# Patient Record
Sex: FEMALE | Race: WHITE | Employment: UNEMPLOYED | URBAN - METROPOLITAN AREA
[De-identification: names, ages, dates, MRNs, and addresses within clinical notes are randomized per-mention and may not be internally consistent; named-entity substitution may affect disease eponyms.]

---

## 2017-03-03 ENCOUNTER — APPOINTMENT (OUTPATIENT)
Dept: GENERAL RADIOLOGY | Facility: HOSPITAL | Age: 42
End: 2017-03-03
Attending: EMERGENCY MEDICINE
Payer: COMMERCIAL

## 2017-03-03 ENCOUNTER — HOSPITAL ENCOUNTER (EMERGENCY)
Facility: HOSPITAL | Age: 42
Discharge: HOME | End: 2017-03-03
Attending: EMERGENCY MEDICINE
Payer: COMMERCIAL

## 2017-03-03 VITALS
OXYGEN SATURATION: 100 % | TEMPERATURE: 99 F | WEIGHT: 130 LBS | HEART RATE: 53 BPM | HEIGHT: 64 IN | SYSTOLIC BLOOD PRESSURE: 109 MMHG | DIASTOLIC BLOOD PRESSURE: 74 MMHG | RESPIRATION RATE: 12 BRPM | BODY MASS INDEX: 22.2 KG/M2

## 2017-03-03 DIAGNOSIS — R42 DIZZINESS: ICD-10-CM

## 2017-03-03 DIAGNOSIS — M94.0 COSTAL CHONDRITIS: Primary | ICD-10-CM

## 2017-03-03 LAB
ALBUMIN SERPL-MCNC: 4.6 G/DL (ref 3.5–4.8)
ALP LIVER SERPL-CCNC: 46 U/L (ref 37–98)
ALT SERPL-CCNC: 21 U/L (ref 14–54)
AST SERPL-CCNC: 12 U/L (ref 15–41)
ATRIAL RATE: 74 BPM
BASOPHILS # BLD AUTO: 0.03 X10(3) UL (ref 0–0.1)
BASOPHILS NFR BLD AUTO: 0.6 %
BILIRUB SERPL-MCNC: 0.8 MG/DL (ref 0.1–2)
BUN BLD-MCNC: 18 MG/DL (ref 8–20)
CALCIUM BLD-MCNC: 10.1 MG/DL (ref 8.3–10.3)
CHLORIDE: 105 MMOL/L (ref 101–111)
CO2: 26 MMOL/L (ref 22–32)
CREAT BLD-MCNC: 0.79 MG/DL (ref 0.55–1.02)
EOSINOPHIL # BLD AUTO: 0.07 X10(3) UL (ref 0–0.3)
EOSINOPHIL NFR BLD AUTO: 1.3 %
ERYTHROCYTE [DISTWIDTH] IN BLOOD BY AUTOMATED COUNT: 12.2 % (ref 11.5–16)
GLUCOSE BLD-MCNC: 92 MG/DL (ref 70–99)
HCT VFR BLD AUTO: 39.7 % (ref 34–50)
HGB BLD-MCNC: 13.2 G/DL (ref 12–16)
IMMATURE GRANULOCYTE COUNT: 0.01 X10(3) UL (ref 0–1)
IMMATURE GRANULOCYTE RATIO %: 0.2 %
LYMPHOCYTES # BLD AUTO: 2.09 X10(3) UL (ref 0.9–4)
LYMPHOCYTES NFR BLD AUTO: 38.4 %
M PROTEIN MFR SERPL ELPH: 8.2 G/DL (ref 6.1–8.3)
MCH RBC QN AUTO: 32.1 PG (ref 27–33.2)
MCHC RBC AUTO-ENTMCNC: 33.2 G/DL (ref 31–37)
MCV RBC AUTO: 96.6 FL (ref 81–100)
MONOCYTES # BLD AUTO: 0.4 X10(3) UL (ref 0.1–0.6)
MONOCYTES NFR BLD AUTO: 7.4 %
NEUTROPHIL ABS PRELIM: 2.84 X10 (3) UL (ref 1.3–6.7)
NEUTROPHILS # BLD AUTO: 2.84 X10(3) UL (ref 1.3–6.7)
NEUTROPHILS NFR BLD AUTO: 52.1 %
P AXIS: 52 DEGREES
P-R INTERVAL: 120 MS
PLATELET # BLD AUTO: 124 10(3)UL (ref 150–450)
POTASSIUM SERPL-SCNC: 3.7 MMOL/L (ref 3.6–5.1)
Q-T INTERVAL: 370 MS
QRS DURATION: 78 MS
QTC CALCULATION (BEZET): 410 MS
R AXIS: 7 DEGREES
RBC # BLD AUTO: 4.11 X10(6)UL (ref 3.8–5.1)
RED CELL DISTRIBUTION WIDTH-SD: 42.9 FL (ref 35.1–46.3)
SODIUM SERPL-SCNC: 139 MMOL/L (ref 136–144)
T AXIS: 30 DEGREES
TROPONIN: <0.046 NG/ML (ref ?–0.05)
VENTRICULAR RATE: 74 BPM
WBC # BLD AUTO: 5.4 X10(3) UL (ref 4–13)

## 2017-03-03 PROCEDURE — 99285 EMERGENCY DEPT VISIT HI MDM: CPT

## 2017-03-03 PROCEDURE — 85025 COMPLETE CBC W/AUTO DIFF WBC: CPT | Performed by: EMERGENCY MEDICINE

## 2017-03-03 PROCEDURE — 96360 HYDRATION IV INFUSION INIT: CPT

## 2017-03-03 PROCEDURE — 71020 XR CHEST PA + LAT CHEST (CPT=71020): CPT

## 2017-03-03 PROCEDURE — 99284 EMERGENCY DEPT VISIT MOD MDM: CPT

## 2017-03-03 PROCEDURE — 84484 ASSAY OF TROPONIN QUANT: CPT | Performed by: EMERGENCY MEDICINE

## 2017-03-03 PROCEDURE — 93010 ELECTROCARDIOGRAM REPORT: CPT

## 2017-03-03 PROCEDURE — 80053 COMPREHEN METABOLIC PANEL: CPT | Performed by: EMERGENCY MEDICINE

## 2017-03-03 PROCEDURE — 96361 HYDRATE IV INFUSION ADD-ON: CPT

## 2017-03-03 PROCEDURE — 93005 ELECTROCARDIOGRAM TRACING: CPT

## 2017-03-03 NOTE — ED INITIAL ASSESSMENT (HPI)
Pt reports 2 weeks ago heart burn feeling, blurred vision, vertigo today. Pt reports taking stairs and became SOB, chest pain. Pt reports recent cold.

## 2017-03-03 NOTE — ED PROVIDER NOTES
Patient Seen in: [facility] Emergency Department    History   Patient presents with:  Chest Pain Angina (cardiovascular)    Stated Complaint: vertigo, blurred vision, heart burn x2 weeks    HPI    59-year-old female presents with chest pain and dizz in HPI.     Physical Exam       ED Triage Vitals   BP 03/03/17 1108 142/80 mmHg   Pulse 03/03/17 1108 67   Resp 03/03/17 1108 18   Temp 03/03/17 1108 98.5 °F (36.9 °C)   Temp src 03/03/17 1108 Temporal   SpO2 03/03/17 1108 100 %   O2 Device 03/03/17 1108 No axes as noted on EKG Report. Rate: 74  Rhythm: Sinus Rhythm  Reading: No evidence of acute ischemia. No arrhythmia.           Xr Chest Pa + Lat Chest (ray=[id number])    3/3/2017  PROCEDURE:  XR CHEST PA + LAT CHEST (CPT=71020)  INDICATIONS:  vertigo, blurred

## 2017-04-17 ENCOUNTER — OFFICE VISIT (OUTPATIENT)
Dept: FAMILY MEDICINE CLINIC | Facility: CLINIC | Age: 42
End: 2017-04-17

## 2017-04-17 VITALS
RESPIRATION RATE: 16 BRPM | OXYGEN SATURATION: 99 % | DIASTOLIC BLOOD PRESSURE: 76 MMHG | BODY MASS INDEX: 22.53 KG/M2 | HEART RATE: 70 BPM | HEIGHT: 64 IN | SYSTOLIC BLOOD PRESSURE: 108 MMHG | WEIGHT: 132 LBS | TEMPERATURE: 97 F

## 2017-04-17 DIAGNOSIS — Z13.228 SCREENING FOR ENDOCRINE, METABOLIC AND IMMUNITY DISORDER: ICD-10-CM

## 2017-04-17 DIAGNOSIS — Z13.0 SCREENING, ANEMIA, DEFICIENCY, IRON: ICD-10-CM

## 2017-04-17 DIAGNOSIS — Z13.220 SCREENING FOR LIPOID DISORDERS: ICD-10-CM

## 2017-04-17 DIAGNOSIS — Z11.51 SCREENING FOR HUMAN PAPILLOMAVIRUS (HPV): ICD-10-CM

## 2017-04-17 DIAGNOSIS — Z00.00 WELL WOMAN EXAM WITHOUT GYNECOLOGICAL EXAM: Primary | ICD-10-CM

## 2017-04-17 DIAGNOSIS — Z13.29 SCREENING FOR ENDOCRINE, METABOLIC AND IMMUNITY DISORDER: ICD-10-CM

## 2017-04-17 DIAGNOSIS — N85.2 ENLARGED UTERUS: ICD-10-CM

## 2017-04-17 DIAGNOSIS — M54.12 LEFT CERVICAL RADICULOPATHY: ICD-10-CM

## 2017-04-17 DIAGNOSIS — Z13.0 SCREENING FOR ENDOCRINE, METABOLIC AND IMMUNITY DISORDER: ICD-10-CM

## 2017-04-17 PROCEDURE — 88175 CYTOPATH C/V AUTO FLUID REDO: CPT | Performed by: FAMILY MEDICINE

## 2017-04-17 PROCEDURE — 87624 HPV HI-RISK TYP POOLED RSLT: CPT | Performed by: FAMILY MEDICINE

## 2017-04-17 PROCEDURE — 99395 PREV VISIT EST AGE 18-39: CPT | Performed by: FAMILY MEDICINE

## 2017-04-17 RX ORDER — IBUPROFEN 200 MG
200 TABLET ORAL EVERY 6 HOURS PRN
COMMUNITY
End: 2020-10-08

## 2017-04-17 NOTE — PROGRESS NOTES
HPI:   David Pryor is a 46year old female who presents for a complete physical exam. Symptoms: periods are regular. Patient complains of recurrent pain in her left lower lateral neck which will radiate down into her left hand.   She denies paresthesias • Heart Disease Father    • Stroke Neg    • Cancer Neg       Social History:     Smoking Status: Former Smoker                   Packs/Day: 0.00  Years:           Quit date: 01/01/1999    Smokeless Status: Never Used                        Alcohol Use: Y cyanosis, clubbing or edema  NEURO: Oriented times three,cranial nerves are intact,motor and sensory are grossly intact    ASSESSMENT AND PLAN:     Well woman exam without gynecological exam  (primary encounter diagnosis)  Left cervical radiculopathy  Enla

## 2017-04-18 RX ORDER — METHYLPREDNISOLONE 4 MG/1
TABLET ORAL
Qty: 1 KIT | Refills: 0 | Status: SHIPPED | OUTPATIENT
Start: 2017-04-18 | End: 2017-11-30

## 2017-04-21 ENCOUNTER — OFFICE VISIT (OUTPATIENT)
Dept: NEUROLOGY | Facility: CLINIC | Age: 42
End: 2017-04-21

## 2017-04-21 DIAGNOSIS — R20.0 NUMBNESS AND TINGLING IN LEFT HAND: Primary | ICD-10-CM

## 2017-04-21 DIAGNOSIS — R20.2 NUMBNESS AND TINGLING IN LEFT HAND: Primary | ICD-10-CM

## 2017-04-21 PROCEDURE — 95886 MUSC TEST DONE W/N TEST COMP: CPT | Performed by: OTHER

## 2017-04-21 PROCEDURE — 95908 NRV CNDJ TST 3-4 STUDIES: CPT | Performed by: OTHER

## 2017-04-21 NOTE — PROCEDURES
Date of service: 4/21/2017    Procedure: Nerve Conduction Study and Electromyography - complete EMG study in left upper extremity. History: Electrodiagnostic testing on this 46year old female was performed in left upper extremity.  The patient is compla 3. There is no electrophysiologic evidence of ongoing denervation in left C5-T1 myotomes suggestive of acute left C5-T1 radiculopathy.    A cervical radiculopathy above C5 level or chronic C5-T1 radiculopathy cannot be completely excluded from this study, C

## 2017-04-21 NOTE — PATIENT INSTRUCTIONS
Refill policies:    • Allow 2 business days for refills; controlled substances may take longer.   • Contact your pharmacy at least 5 days prior to running out of medication and have them send an electronic request or submit request through the “request re insurance carrier to obtain pre-certification or prior authorization. Unfortunately, JUAN DANIEL has seen an increase in denial of payment even though the procedure/test has been pre-certified.   You are strongly encouraged to contact your insurance carrier to v

## 2017-04-24 ENCOUNTER — TELEPHONE (OUTPATIENT)
Dept: FAMILY MEDICINE CLINIC | Facility: CLINIC | Age: 42
End: 2017-04-24

## 2017-04-24 NOTE — TELEPHONE ENCOUNTER
The EMG study was notable for possible mild carpal tunnel and ulnar nerve involvement. Cervical lesion could not be ruled out. Next step would be to obtain an MRI of the cervical spine to further evaluate this.

## 2017-04-25 ENCOUNTER — TELEPHONE (OUTPATIENT)
Dept: FAMILY MEDICINE CLINIC | Facility: CLINIC | Age: 42
End: 2017-04-25

## 2017-04-26 NOTE — TELEPHONE ENCOUNTER
LM to CB. Please tell Dasia her Epi-Pen was recalled due to a defect in the pen failing to deliver the medication. She can take the pen back to the pharmacy for an exchange.  ( paperwork on recall is at [employer])

## 2017-05-05 ENCOUNTER — HOSPITAL ENCOUNTER (OUTPATIENT)
Dept: ULTRASOUND IMAGING | Age: 42
Discharge: HOME | End: 2017-05-05
Attending: FAMILY MEDICINE
Payer: COMMERCIAL

## 2017-05-05 DIAGNOSIS — N85.2 ENLARGED UTERUS: ICD-10-CM

## 2017-05-05 PROCEDURE — 76856 US EXAM PELVIC COMPLETE: CPT | Performed by: FAMILY MEDICINE

## 2017-05-05 PROCEDURE — 76830 TRANSVAGINAL US NON-OB: CPT | Performed by: FAMILY MEDICINE

## 2017-07-25 ENCOUNTER — HOSPITAL ENCOUNTER (OUTPATIENT)
Dept: CT IMAGING | Facility: HOSPITAL | Age: 42
Discharge: HOME | End: 2017-07-25
Attending: FAMILY MEDICINE

## 2017-07-25 DIAGNOSIS — Z13.6 SCREENING FOR HEART DISEASE: ICD-10-CM

## 2017-08-07 ENCOUNTER — TELEPHONE (OUTPATIENT)
Dept: FAMILY MEDICINE CLINIC | Facility: CLINIC | Age: 42
End: 2017-08-07

## 2017-08-07 NOTE — TELEPHONE ENCOUNTER
Call from pt-sts \"had a heart CT done the end of July, received results in [facility] but don't see any comments from dr Kwasi Mansfield. The end of the report mentions they see left calcified lymph nodes but don't see any comments about what that means.  i've been

## 2017-08-07 NOTE — TELEPHONE ENCOUNTER
There is nothing pathologic about the calcifications. They usually occur as a result of previous inflammation or infection.   The rest of the CT of the chest was totally normal.

## 2017-08-07 NOTE — TELEPHONE ENCOUNTER
Call to pt-advised of info noted below from dr Melissa Linton. Patient voices understanding/no further questions.

## 2017-08-12 ENCOUNTER — APPOINTMENT (OUTPATIENT)
Dept: GENERAL RADIOLOGY | Facility: HOSPITAL | Age: 42
End: 2017-08-12
Attending: EMERGENCY MEDICINE
Payer: COMMERCIAL

## 2017-08-12 ENCOUNTER — HOSPITAL ENCOUNTER (EMERGENCY)
Facility: HOSPITAL | Age: 42
Discharge: HOME | End: 2017-08-12
Attending: EMERGENCY MEDICINE
Payer: COMMERCIAL

## 2017-08-12 VITALS
OXYGEN SATURATION: 99 % | HEART RATE: 70 BPM | RESPIRATION RATE: 16 BRPM | BODY MASS INDEX: 22.49 KG/M2 | WEIGHT: 135 LBS | DIASTOLIC BLOOD PRESSURE: 68 MMHG | SYSTOLIC BLOOD PRESSURE: 105 MMHG | HEIGHT: 65 IN | TEMPERATURE: 98 F

## 2017-08-12 DIAGNOSIS — M22.42 RUNNER'S KNEE, LEFT: Primary | ICD-10-CM

## 2017-08-12 PROCEDURE — 73562 X-RAY EXAM OF KNEE 3: CPT | Performed by: EMERGENCY MEDICINE

## 2017-08-12 PROCEDURE — 99283 EMERGENCY DEPT VISIT LOW MDM: CPT

## 2017-08-12 RX ORDER — NAPROXEN 500 MG/1
500 TABLET ORAL 2 TIMES DAILY PRN
Qty: 20 TABLET | Refills: 0 | Status: SHIPPED | OUTPATIENT
Start: 2017-08-12 | End: 2017-08-19

## 2017-08-12 RX ORDER — TRAMADOL HYDROCHLORIDE 50 MG/1
50 TABLET ORAL ONCE
Status: COMPLETED | OUTPATIENT
Start: 2017-08-12 | End: 2017-08-12

## 2017-08-12 RX ORDER — TRAMADOL HYDROCHLORIDE 50 MG/1
TABLET ORAL EVERY 4 HOURS PRN
Qty: 20 TABLET | Refills: 0 | Status: SHIPPED | OUTPATIENT
Start: 2017-08-12 | End: 2017-08-19

## 2017-08-12 NOTE — ED INITIAL ASSESSMENT (HPI)
Pt to ER c/o left knee pain. Pt states she has been training for marathon and pain started last week. Limited flexion and extension. Pt states she has not been doing any exercise this past week but pain is getting worse. Denies any numbness or tingling.  Ruperto Kawasaki

## 2017-08-12 NOTE — ED PROVIDER NOTES
Patient Seen in: [facility] Emergency Department    History   Patient presents with:  Lower Extremity Injury (musculoskeletal)    Stated Complaint: left knee pain/began last weekend after running    HPI    53-year-old female presents for evaluation weekend after running  Other systems are as noted in HPI. Constitutional and vital signs reviewed. All other systems reviewed and negative except as noted above. PSFH elements reviewed from today and agreed except as otherwise stated in HPI.     Ph needed for Pain. Qty: 20 tablet Refills: 0    naproxen 500 MG Oral Tab  Take 1 tablet (500 mg total) by mouth 2 (two) times daily as needed.   Qty: 20 tablet Refills: 0

## 2017-11-30 ENCOUNTER — HOSPITAL ENCOUNTER (OUTPATIENT)
Age: 42
Discharge: HOME | End: 2017-11-30
Attending: FAMILY MEDICINE
Payer: COMMERCIAL

## 2017-11-30 VITALS
HEART RATE: 54 BPM | DIASTOLIC BLOOD PRESSURE: 94 MMHG | RESPIRATION RATE: 16 BRPM | TEMPERATURE: 99 F | SYSTOLIC BLOOD PRESSURE: 124 MMHG | OXYGEN SATURATION: 99 %

## 2017-11-30 DIAGNOSIS — Z20.818 STREP THROAT EXPOSURE: ICD-10-CM

## 2017-11-30 DIAGNOSIS — J02.9 ACUTE PHARYNGITIS, UNSPECIFIED ETIOLOGY: Primary | ICD-10-CM

## 2017-11-30 PROCEDURE — 87081 CULTURE SCREEN ONLY: CPT | Performed by: FAMILY MEDICINE

## 2017-11-30 PROCEDURE — 99204 OFFICE O/P NEW MOD 45 MIN: CPT

## 2017-11-30 PROCEDURE — 87430 STREP A AG IA: CPT | Performed by: FAMILY MEDICINE

## 2017-11-30 PROCEDURE — 99214 OFFICE O/P EST MOD 30 MIN: CPT

## 2017-11-30 RX ORDER — AMOXICILLIN 875 MG/1
875 TABLET, COATED ORAL 2 TIMES DAILY
Qty: 20 TABLET | Refills: 0 | Status: SHIPPED | OUTPATIENT
Start: 2017-11-30 | End: 2017-12-10

## 2017-11-30 NOTE — ED PROVIDER NOTES
Patient Seen in: [address] [facility]    History   Patient presents with:  Sore Throat    Stated Complaint: FEVER/SORE THROAT/ACHY    HPI    37-year-old female presents to the clinic today with chief complaints of sore throat, myalgia, headach trachea midline and thyroid not enlarged, symmetric, no tenderness/mass/nodules  Lungs: clear to auscultation bilaterally  Heart: S1, S2 normal, no murmur, click, rub or gallop, regular rate and rhythm  Abdomen: soft, non-tender; bowel sounds normal; no ma

## 2017-11-30 NOTE — ED INITIAL ASSESSMENT (HPI)
Pt states since last night achey and sore throat. No fever or cold symptoms.  Daughter diagnosed with strep on sunday

## 2018-02-10 ENCOUNTER — TELEPHONE (OUTPATIENT)
Dept: FAMILY MEDICINE CLINIC | Facility: CLINIC | Age: 43
End: 2018-02-10

## 2018-02-10 ENCOUNTER — HOSPITAL ENCOUNTER (OUTPATIENT)
Age: 43
Discharge: HOME | End: 2018-02-10
Payer: COMMERCIAL

## 2018-02-10 VITALS
HEART RATE: 51 BPM | WEIGHT: 135 LBS | BODY MASS INDEX: 22.49 KG/M2 | TEMPERATURE: 97 F | DIASTOLIC BLOOD PRESSURE: 70 MMHG | RESPIRATION RATE: 16 BRPM | SYSTOLIC BLOOD PRESSURE: 110 MMHG | OXYGEN SATURATION: 99 % | HEIGHT: 65 IN

## 2018-02-10 DIAGNOSIS — J02.0 STREPTOCOCCAL SORE THROAT: Primary | ICD-10-CM

## 2018-02-10 LAB — POCT RAPID STREP: POSITIVE

## 2018-02-10 PROCEDURE — 87430 STREP A AG IA: CPT | Performed by: PHYSICIAN ASSISTANT

## 2018-02-10 PROCEDURE — 99213 OFFICE O/P EST LOW 20 MIN: CPT

## 2018-02-10 PROCEDURE — 99214 OFFICE O/P EST MOD 30 MIN: CPT

## 2018-02-10 RX ORDER — AMOXICILLIN 875 MG/1
875 TABLET, COATED ORAL 2 TIMES DAILY
Qty: 20 TABLET | Refills: 0 | Status: SHIPPED | OUTPATIENT
Start: 2018-02-10 | End: 2018-02-20

## 2018-02-10 NOTE — ED PROVIDER NOTES
Patient Seen in: [address] [facility]    History   Patient presents with:  Sore Throat    Stated Complaint: sore throat    HPI    Dewey Hopper is a 44-year-old female presents today for evaluation of persistent sore throat.   She has a past medical hist Exam   Constitutional: She is oriented to person, place, and time. She appears well-developed and well-nourished. HENT:   Head: Normocephalic and atraumatic.    Right Ear: Hearing, tympanic membrane, external ear and ear canal normal.   Left Ear: Hearing, diagnosis)    Disposition:  Discharge  2/10/2018 11:28 am    Follow-up:  Essence Majano MD  [address] [place] [address] [address]  [address] [place] [address] [facility] [phone]    In 1 week  As needed        Medications Prescribed:  Current Discharge Medication List    START ta

## 2018-02-10 NOTE — TELEPHONE ENCOUNTER
Please call Dasia on Monday for an update.   If any of her children have symptoms we could call in a antibiotic as they likely have strep

## 2018-02-12 NOTE — TELEPHONE ENCOUNTER
T.C. To Dasia. She is feeling better. I asked if any of the kids had any symptoms and she said they were all fine at this time. I advised her if any of the kids start having symptoms to call and Dr. Thao Reyes would prescribe an antibiotic for them.  Dasia agree

## 2018-02-26 ENCOUNTER — HOSPITAL ENCOUNTER (OUTPATIENT)
Age: 43
Discharge: HOME | End: 2018-02-26
Attending: FAMILY MEDICINE
Payer: COMMERCIAL

## 2018-02-26 ENCOUNTER — TELEPHONE (OUTPATIENT)
Dept: FAMILY MEDICINE CLINIC | Facility: CLINIC | Age: 43
End: 2018-02-26

## 2018-02-26 VITALS
TEMPERATURE: 99 F | DIASTOLIC BLOOD PRESSURE: 68 MMHG | OXYGEN SATURATION: 97 % | HEART RATE: 77 BPM | SYSTOLIC BLOOD PRESSURE: 111 MMHG | RESPIRATION RATE: 12 BRPM

## 2018-02-26 DIAGNOSIS — J02.0 STREPTOCOCCAL SORE THROAT: Primary | ICD-10-CM

## 2018-02-26 PROCEDURE — 99213 OFFICE O/P EST LOW 20 MIN: CPT

## 2018-02-26 PROCEDURE — 99214 OFFICE O/P EST MOD 30 MIN: CPT

## 2018-02-26 PROCEDURE — 87430 STREP A AG IA: CPT | Performed by: FAMILY MEDICINE

## 2018-02-26 RX ORDER — CEPHALEXIN 500 MG/1
500 CAPSULE ORAL 3 TIMES DAILY
Qty: 30 CAPSULE | Refills: 0 | Status: SHIPPED | OUTPATIENT
Start: 2018-02-26 | End: 2018-03-08

## 2018-02-26 NOTE — TELEPHONE ENCOUNTER
Pt returned call and stated that she finished her ABX a week ago and was feeling better. The Friday night her sore throat returned, got really bad Sunday/ yesterday. Pt denies any fever (did not check), even though she woke Sat and Sun sweating.   Advised

## 2018-02-26 NOTE — ED NOTES
Rapid strep was positive. Result was entered incorrectly. MD informed. Lab correction form submitted.

## 2018-02-26 NOTE — ED PROVIDER NOTES
Patient Seen in: [address] [facility]    History   Patient presents with:  Sore Throat  Fever (infectious)    Stated Complaint: sore throat fever    HPI  46-year-old female presents to the immediate care today with 3 day history of sore Mucosa normal. No drainage or sinus tenderness.   Throat: abnormal findings: moderate oropharyngeal erythema  Neck: moderate anterior cervical adenopathy, no carotid bruit, no JVD and supple, symmetrical, trachea midline  Lungs: clear to auscultation bilate

## 2018-02-26 NOTE — TELEPHONE ENCOUNTER
Pt returned call and stated that she is having sore throat and dryness that started back up on Friday evening, but that it has gotten worse as of Sunday. Sat and Sun she woke up sweating, but does not feel as though she has any fever now.   Pt stated that

## 2018-02-26 NOTE — ED INITIAL ASSESSMENT (HPI)
Pt sts here 2/10 for strep. Completed course of Amox. Sts sore throat had improved but didn't fully resolve until Wed 2/21. Sore throat developed again on 2/23. Feeling hot/cold flashes, very tired.

## 2018-02-26 NOTE — TELEPHONE ENCOUNTER
Cullen for pt to Cb. Pt was positive for strep on 02/10/18. She mentioned she had a ST, that had resolved. She finished the antibiotic but now the ST is back and she is having night sweats. Ask her how many days she was feeling better?   She may need to be see

## 2018-02-27 LAB — POCT RAPID STREP: POSITIVE

## 2018-06-07 RX ORDER — EPINEPHRINE 0.3 MG/.3ML
INJECTION SUBCUTANEOUS
Qty: 2 EACH | Refills: 2 | Status: SHIPPED | OUTPATIENT
Start: 2018-06-07 | End: 2019-03-19

## 2018-07-06 ENCOUNTER — LAB ENCOUNTER (OUTPATIENT)
Dept: LAB | Age: 43
End: 2018-07-06
Attending: FAMILY MEDICINE
Payer: COMMERCIAL

## 2018-07-06 ENCOUNTER — OFFICE VISIT (OUTPATIENT)
Dept: FAMILY MEDICINE CLINIC | Facility: CLINIC | Age: 43
End: 2018-07-06

## 2018-07-06 VITALS
DIASTOLIC BLOOD PRESSURE: 74 MMHG | WEIGHT: 135 LBS | HEIGHT: 64 IN | BODY MASS INDEX: 23.05 KG/M2 | SYSTOLIC BLOOD PRESSURE: 100 MMHG | HEART RATE: 52 BPM | TEMPERATURE: 98 F | RESPIRATION RATE: 12 BRPM

## 2018-07-06 DIAGNOSIS — M79.10 MYALGIA: Primary | ICD-10-CM

## 2018-07-06 DIAGNOSIS — M79.10 MYALGIA: ICD-10-CM

## 2018-07-06 DIAGNOSIS — R53.83 FATIGUE, UNSPECIFIED TYPE: ICD-10-CM

## 2018-07-06 DIAGNOSIS — M25.50 ARTHRALGIA, UNSPECIFIED JOINT: ICD-10-CM

## 2018-07-06 LAB
25-HYDROXYVITAMIN D (TOTAL): 41.3 NG/ML (ref 30–100)
ALBUMIN SERPL-MCNC: 4 G/DL (ref 3.5–4.8)
ALP LIVER SERPL-CCNC: 37 U/L (ref 37–98)
ALT SERPL-CCNC: 20 U/L (ref 14–54)
AST SERPL-CCNC: 11 U/L (ref 15–41)
BASOPHILS # BLD AUTO: 0.02 X10(3) UL (ref 0–0.1)
BASOPHILS NFR BLD AUTO: 0.3 %
BILIRUB SERPL-MCNC: 0.6 MG/DL (ref 0.1–2)
BUN BLD-MCNC: 13 MG/DL (ref 8–20)
C-REACTIVE PROTEIN: <0.29 MG/DL (ref ?–1)
CALCIUM BLD-MCNC: 9.2 MG/DL (ref 8.3–10.3)
CHLORIDE: 106 MMOL/L (ref 101–111)
CO2: 27 MMOL/L (ref 22–32)
CREAT BLD-MCNC: 0.86 MG/DL (ref 0.55–1.02)
EOSINOPHIL # BLD AUTO: 0.09 X10(3) UL (ref 0–0.3)
EOSINOPHIL NFR BLD AUTO: 1.5 %
ERYTHROCYTE [DISTWIDTH] IN BLOOD BY AUTOMATED COUNT: 12.8 % (ref 11.5–16)
GLUCOSE BLD-MCNC: 84 MG/DL (ref 70–99)
HCT VFR BLD AUTO: 38.7 % (ref 34–50)
HGB BLD-MCNC: 11.9 G/DL (ref 12–16)
IMMATURE GRANULOCYTE COUNT: 0.01 X10(3) UL (ref 0–1)
IMMATURE GRANULOCYTE RATIO %: 0.2 %
LYMPHOCYTES # BLD AUTO: 1.78 X10(3) UL (ref 0.9–4)
LYMPHOCYTES NFR BLD AUTO: 29.6 %
M PROTEIN MFR SERPL ELPH: 7.9 G/DL (ref 6.1–8.3)
MCH RBC QN AUTO: 31.2 PG (ref 27–33.2)
MCHC RBC AUTO-ENTMCNC: 30.7 G/DL (ref 31–37)
MCV RBC AUTO: 101.6 FL (ref 81–100)
MONOCYTES # BLD AUTO: 0.37 X10(3) UL (ref 0.1–1)
MONOCYTES NFR BLD AUTO: 6.1 %
NEUTROPHIL ABS PRELIM: 3.75 X10 (3) UL (ref 1.3–6.7)
NEUTROPHILS # BLD AUTO: 3.75 X10(3) UL (ref 1.3–6.7)
NEUTROPHILS NFR BLD AUTO: 62.3 %
PLATELET # BLD AUTO: 124 10(3)UL (ref 150–450)
POTASSIUM SERPL-SCNC: 4.2 MMOL/L (ref 3.6–5.1)
RBC # BLD AUTO: 3.81 X10(6)UL (ref 3.8–5.1)
RED CELL DISTRIBUTION WIDTH-SD: 48 FL (ref 35.1–46.3)
RHEUMATOID FACT SERPL-ACNC: <10 IU/ML (ref ?–15)
SODIUM SERPL-SCNC: 140 MMOL/L (ref 136–144)
TSI SER-ACNC: 0.9 MIU/ML (ref 0.35–5.5)
WBC # BLD AUTO: 6 X10(3) UL (ref 4–13)

## 2018-07-06 PROCEDURE — 36415 COLL VENOUS BLD VENIPUNCTURE: CPT

## 2018-07-06 PROCEDURE — 84443 ASSAY THYROID STIM HORMONE: CPT

## 2018-07-06 PROCEDURE — 86431 RHEUMATOID FACTOR QUANT: CPT

## 2018-07-06 PROCEDURE — 80053 COMPREHEN METABOLIC PANEL: CPT

## 2018-07-06 PROCEDURE — 85025 COMPLETE CBC W/AUTO DIFF WBC: CPT

## 2018-07-06 PROCEDURE — 86038 ANTINUCLEAR ANTIBODIES: CPT

## 2018-07-06 PROCEDURE — 99214 OFFICE O/P EST MOD 30 MIN: CPT | Performed by: FAMILY MEDICINE

## 2018-07-06 PROCEDURE — 85652 RBC SED RATE AUTOMATED: CPT

## 2018-07-06 PROCEDURE — 86200 CCP ANTIBODY: CPT

## 2018-07-06 PROCEDURE — 86140 C-REACTIVE PROTEIN: CPT

## 2018-07-06 PROCEDURE — 82306 VITAMIN D 25 HYDROXY: CPT

## 2018-07-06 RX ORDER — MELOXICAM 15 MG/1
15 TABLET ORAL DAILY
Qty: 30 TABLET | Refills: 3 | Status: SHIPPED | OUTPATIENT
Start: 2018-07-06 | End: 2018-07-24

## 2018-07-06 RX ORDER — TRAMADOL HYDROCHLORIDE 50 MG/1
TABLET ORAL
Qty: 30 TABLET | Refills: 1 | Status: SHIPPED | OUTPATIENT
Start: 2018-07-06 | End: 2018-12-07 | Stop reason: ALTCHOICE

## 2018-07-06 NOTE — PROGRESS NOTES
Xavier Botello is a 38year old female. CHIEF COMPLAINT   Fatigue, pain  HPI:   Taking ibuprofen 600 mg prn. Six months ago started noticing upper back pain, neck pain, fatigue. Left knee pain also without a known cause.   Seemed to be sore and stiff af shortness of breath with exertion  CARDIOVASCULAR: denies chest pain on exertion  GI: denies abdominal pain and denies heartburn  NEURO: as above    EXAM:   /74   Pulse 52   Temp 98 °F (36.7 °C) (Oral)   Resp 12   Ht 64\"   Wt 135 lb   BMI 23.17 kg/m

## 2018-07-07 LAB — SED RATE-ML: 4 MM/HR (ref 0–25)

## 2018-07-08 LAB — CYCLIC CITRULLINATED PEPTIDE: 2 UNITS

## 2018-07-09 DIAGNOSIS — R71.8 ELEVATED MCV: Primary | ICD-10-CM

## 2018-07-09 LAB — ANA SCREEN: NEGATIVE

## 2018-07-24 PROBLEM — F41.9 ANXIETY AND DEPRESSION: Status: ACTIVE | Noted: 2018-07-24

## 2018-07-24 PROBLEM — F32.A ANXIETY AND DEPRESSION: Status: ACTIVE | Noted: 2018-07-24

## 2018-07-24 NOTE — PROGRESS NOTES
David Pryor is a 38year old female. HPI:   The patient is here for follow-up of her lab testing for fatigue, arthralgias and intermittent anxiety.   All her labs done on 7/6/2018 including a CRP, sed rate, rheumatoid factor, anti-CCP, DILLAN, TSH, CMP an Alcohol use: Yes           0.0 oz/week     Comment: occ. 1 drink       REVIEW OF SYSTEMS:   GENERAL HEALTH: feels well otherwise  SKIN: denies any unusual skin lesions or rashes  RESPIRATORY: denies shortness of breath with exertion  CARDIOVASCULAR:

## 2018-08-07 DIAGNOSIS — F41.9 ANXIETY AND DEPRESSION: ICD-10-CM

## 2018-08-07 DIAGNOSIS — F32.A ANXIETY AND DEPRESSION: ICD-10-CM

## 2018-08-09 DIAGNOSIS — F32.A ANXIETY AND DEPRESSION: ICD-10-CM

## 2018-08-09 DIAGNOSIS — F41.9 ANXIETY AND DEPRESSION: ICD-10-CM

## 2018-08-09 RX ORDER — ALPRAZOLAM 0.25 MG/1
0.25 TABLET ORAL 3 TIMES DAILY PRN
Qty: 30 TABLET | Refills: 0
Start: 2018-08-09 | End: 2018-08-09

## 2018-08-09 RX ORDER — ALPRAZOLAM 0.25 MG/1
0.25 TABLET ORAL 3 TIMES DAILY PRN
Qty: 30 TABLET | Refills: 1 | Status: SHIPPED | OUTPATIENT
Start: 2018-08-09 | End: 2018-09-20

## 2018-08-09 NOTE — TELEPHONE ENCOUNTER
From: Pedro Lutz  Sent: 8/7/2018 1:04 PM CDT  Subject: Medication Renewal Request    Paramjit Singleton would like a refill of the following medications:     ALPRAZolam Morales Mace) 0.25 MG Oral Tab Hillary Sheikh MD]    Preferred pharmacy: [facility] [address]

## 2018-09-20 DIAGNOSIS — F32.A ANXIETY AND DEPRESSION: ICD-10-CM

## 2018-09-20 DIAGNOSIS — F41.9 ANXIETY AND DEPRESSION: ICD-10-CM

## 2018-09-20 RX ORDER — ALPRAZOLAM 0.25 MG/1
0.25 TABLET ORAL 3 TIMES DAILY PRN
Qty: 30 TABLET | Refills: 0 | Status: SHIPPED | OUTPATIENT
Start: 2018-09-20 | End: 2018-10-17

## 2018-10-17 DIAGNOSIS — F32.A ANXIETY AND DEPRESSION: ICD-10-CM

## 2018-10-17 DIAGNOSIS — F41.9 ANXIETY AND DEPRESSION: ICD-10-CM

## 2018-10-18 RX ORDER — ALPRAZOLAM 0.25 MG/1
TABLET ORAL
Qty: 30 TABLET | Refills: 0 | Status: SHIPPED | OUTPATIENT
Start: 2018-10-18 | End: 2018-11-09

## 2018-10-18 NOTE — TELEPHONE ENCOUNTER
ALPRAZOLAM 0.25MG TABLETS    Non protocol medication. Please see pended medications. Please sign & print if appropriate. Thank you    Last refill was on 09/20/18 for 30 tabs. Her last OV was on 07/24/2018.

## 2018-11-09 DIAGNOSIS — F41.9 ANXIETY AND DEPRESSION: ICD-10-CM

## 2018-11-09 DIAGNOSIS — F32.A ANXIETY AND DEPRESSION: ICD-10-CM

## 2018-11-09 NOTE — TELEPHONE ENCOUNTER
Pt was last seen for depression and anxiety on 07/24/18. She was suppose to follow up in August and cancelled her appt. She needs to make a follow up with Dr. Francesca De Los Santos. Last refill of alprazolam 0.25 mg was on 10/18/18 for # 30 0 refills.

## 2018-11-12 RX ORDER — ALPRAZOLAM 0.25 MG/1
TABLET ORAL
Qty: 30 TABLET | Refills: 0 | Status: SHIPPED | OUTPATIENT
Start: 2018-11-12 | End: 2019-01-10

## 2018-12-07 NOTE — PROGRESS NOTES
Moshe Hanna is a 38year old female. HPI:   The patient is here for follow-up of her anxiety. She has been taking Lexapro 10 mg daily with good result. She no longer feels overwhelmed. She does occasionally have difficulty sleeping.   She has had no denies headaches    EXAM:   /84   Pulse 96   Temp 98.4 °F (36.9 °C)   Resp 16   Ht 64\"   Wt 135 lb   BMI 23.17 kg/m²   GENERAL: well developed, well nourished,in no apparent distress  NEURO: Cranial nerves II through XII are grossly intact  PSYCH: P

## 2019-01-10 DIAGNOSIS — F32.A ANXIETY AND DEPRESSION: ICD-10-CM

## 2019-01-10 DIAGNOSIS — F41.9 ANXIETY AND DEPRESSION: ICD-10-CM

## 2019-01-10 RX ORDER — ALPRAZOLAM 0.25 MG/1
TABLET ORAL
Qty: 30 TABLET | Refills: 0 | Status: SHIPPED | OUTPATIENT
Start: 2019-01-10 | End: 2019-01-29

## 2019-01-29 DIAGNOSIS — F32.A ANXIETY AND DEPRESSION: ICD-10-CM

## 2019-01-29 DIAGNOSIS — F41.9 ANXIETY AND DEPRESSION: ICD-10-CM

## 2019-01-29 RX ORDER — ALPRAZOLAM 0.25 MG/1
TABLET ORAL
Qty: 30 TABLET | Refills: 0 | Status: SHIPPED | OUTPATIENT
Start: 2019-01-29 | End: 2020-10-08

## 2019-03-04 ENCOUNTER — HOSPITAL ENCOUNTER (OUTPATIENT)
Dept: MAMMOGRAPHY | Age: 44
Discharge: HOME | End: 2019-03-04
Attending: FAMILY MEDICINE
Payer: COMMERCIAL

## 2019-03-04 DIAGNOSIS — Z12.39 SCREENING FOR MALIGNANT NEOPLASM OF BREAST: ICD-10-CM

## 2019-03-04 PROCEDURE — 77063 BREAST TOMOSYNTHESIS BI: CPT | Performed by: FAMILY MEDICINE

## 2019-03-04 PROCEDURE — 77067 SCR MAMMO BI INCL CAD: CPT | Performed by: FAMILY MEDICINE

## 2019-03-19 ENCOUNTER — OFFICE VISIT (OUTPATIENT)
Dept: FAMILY MEDICINE CLINIC | Facility: CLINIC | Age: 44
End: 2019-03-19
Payer: COMMERCIAL

## 2019-03-19 VITALS
WEIGHT: 141 LBS | TEMPERATURE: 98 F | HEIGHT: 64.5 IN | SYSTOLIC BLOOD PRESSURE: 112 MMHG | HEART RATE: 87 BPM | BODY MASS INDEX: 23.78 KG/M2 | DIASTOLIC BLOOD PRESSURE: 70 MMHG | RESPIRATION RATE: 16 BRPM

## 2019-03-19 DIAGNOSIS — Z13.228 SCREENING FOR ENDOCRINE, METABOLIC, AND IMMUNITY DISORDER: ICD-10-CM

## 2019-03-19 DIAGNOSIS — F32.A ANXIETY AND DEPRESSION: ICD-10-CM

## 2019-03-19 DIAGNOSIS — Z13.0 SCREENING FOR ENDOCRINE, METABOLIC, AND IMMUNITY DISORDER: ICD-10-CM

## 2019-03-19 DIAGNOSIS — F41.9 ANXIETY AND DEPRESSION: ICD-10-CM

## 2019-03-19 DIAGNOSIS — Z13.29 SCREENING FOR ENDOCRINE, METABOLIC, AND IMMUNITY DISORDER: ICD-10-CM

## 2019-03-19 DIAGNOSIS — Z13.220 SCREENING FOR LIPOID DISORDERS: ICD-10-CM

## 2019-03-19 DIAGNOSIS — Z13.0 SCREENING FOR IRON DEFICIENCY ANEMIA: ICD-10-CM

## 2019-03-19 DIAGNOSIS — R05.3 PERSISTENT COUGH: ICD-10-CM

## 2019-03-19 DIAGNOSIS — Z00.00 WELL WOMAN EXAM WITHOUT GYNECOLOGICAL EXAM: Primary | ICD-10-CM

## 2019-03-19 DIAGNOSIS — J11.1 FLU SYNDROME: ICD-10-CM

## 2019-03-19 PROCEDURE — 99396 PREV VISIT EST AGE 40-64: CPT | Performed by: FAMILY MEDICINE

## 2019-03-19 PROCEDURE — 99213 OFFICE O/P EST LOW 20 MIN: CPT | Performed by: FAMILY MEDICINE

## 2019-03-19 RX ORDER — EPINEPHRINE 0.3 MG/.3ML
INJECTION SUBCUTANEOUS
Qty: 2 EACH | Refills: 2 | Status: SHIPPED | OUTPATIENT
Start: 2019-03-19

## 2019-03-19 NOTE — PROGRESS NOTES
HPI:   Akhil Fitzpatrick is a 41year old female who presents for a complete physical exam. Symptoms: periods are regular. She did state that she had flulike symptoms 5-6 days ago. She described fever, body aches and headaches.   She has had a persistent co 10 MG Oral Tab Take 1 tablet (10 mg total) by mouth daily. Disp: 30 tablet Rfl: 5   ibuprofen 200 MG Oral Tab Take 200 mg by mouth every 6 (six) hours as needed for Pain. Disp:  Rfl:    Probiotic Product (PROBIOTIC DAILY OR) Take by mouth daily.  Disp:  Rfl rashes,no suspicious lesions  HEENT: atraumatic, normocephalic,ears and throat are clear  EYES:PERRLA, EOMI, normal optic disk,conjunctiva are clear  NECK: supple,no adenopathy,no bruits  LUNGS: clear to auscultation  CARDIO: RRR without murmur  GI: good B

## 2019-03-20 RX ORDER — ESCITALOPRAM OXALATE 10 MG/1
TABLET ORAL
Qty: 15 TABLET | Refills: 0 | Status: SHIPPED
Start: 2019-03-20 | End: 2019-04-09

## 2019-09-05 ENCOUNTER — HOSPITAL ENCOUNTER (OUTPATIENT)
Age: 44
Discharge: HOME | End: 2019-09-05
Payer: COMMERCIAL

## 2019-09-05 VITALS
HEART RATE: 72 BPM | OXYGEN SATURATION: 99 % | TEMPERATURE: 98 F | SYSTOLIC BLOOD PRESSURE: 104 MMHG | RESPIRATION RATE: 16 BRPM | DIASTOLIC BLOOD PRESSURE: 82 MMHG

## 2019-09-05 DIAGNOSIS — S29.012A UPPER BACK STRAIN, INITIAL ENCOUNTER: Primary | ICD-10-CM

## 2019-09-05 PROCEDURE — 99213 OFFICE O/P EST LOW 20 MIN: CPT

## 2019-09-05 PROCEDURE — 99214 OFFICE O/P EST MOD 30 MIN: CPT

## 2019-09-05 RX ORDER — CYCLOBENZAPRINE HCL 5 MG
5 TABLET ORAL 3 TIMES DAILY PRN
Qty: 25 TABLET | Refills: 0 | Status: SHIPPED | OUTPATIENT
Start: 2019-09-05 | End: 2019-09-12

## 2019-09-05 RX ORDER — METHYLPREDNISOLONE 4 MG/1
TABLET ORAL
Qty: 1 PACKAGE | Refills: 0 | Status: SHIPPED | OUTPATIENT
Start: 2019-09-05 | End: 2019-09-13 | Stop reason: ALTCHOICE

## 2019-09-05 NOTE — ED PROVIDER NOTES
Patient Seen in: [address] [facility]    History   Patient presents with:  Shoulder Pain    Stated Complaint: left shoulder pain    56-year-old female presents today with complaints of ongoing left scapular pain radiating up into the neck and °F (36.7 °C)   Temp src Temporal   SpO2 99 %   O2 Device None (Room air)       Current:/82   Pulse 72   Temp 98.1 °F (36.7 °C) (Temporal)   Resp 16   LMP 08/23/2019   SpO2 99%         Physical Exam   Constitutional: She is oriented to person, place, throughout the day. If any times or is having shortness of breath or chest pain, or has any numbness or weakness patient is to go immediately to the ER. Patient verbalized understanding agree with plan of care.             Disposition and Plan     [facility]

## 2019-09-05 NOTE — ED INITIAL ASSESSMENT (HPI)
Has had left shoulder pain for about a month. Has been really tight and pain is worsening since she is training for a marathon. No specific injury just running and pressure seems to make worse.  Was seen a walk in care for ifeanyi about a week ago and given

## 2019-09-13 ENCOUNTER — OFFICE VISIT (OUTPATIENT)
Dept: FAMILY MEDICINE CLINIC | Facility: CLINIC | Age: 44
End: 2019-09-13
Payer: COMMERCIAL

## 2019-09-13 VITALS
TEMPERATURE: 98 F | BODY MASS INDEX: 24.62 KG/M2 | DIASTOLIC BLOOD PRESSURE: 72 MMHG | HEIGHT: 64.5 IN | HEART RATE: 88 BPM | SYSTOLIC BLOOD PRESSURE: 128 MMHG | WEIGHT: 146 LBS | RESPIRATION RATE: 16 BRPM | OXYGEN SATURATION: 99 %

## 2019-09-13 DIAGNOSIS — M62.838 MUSCLE SPASM OF LEFT SHOULDER: Primary | ICD-10-CM

## 2019-09-13 PROCEDURE — 99213 OFFICE O/P EST LOW 20 MIN: CPT | Performed by: FAMILY MEDICINE

## 2019-09-13 RX ORDER — ORPHENADRINE CITRATE 100 MG/1
100 TABLET, EXTENDED RELEASE ORAL 2 TIMES DAILY
Qty: 10 TABLET | Refills: 1 | Status: SHIPPED | OUTPATIENT
Start: 2019-09-13 | End: 2019-10-14

## 2019-09-13 RX ORDER — HYDROCODONE BITARTRATE AND ACETAMINOPHEN 5; 325 MG/1; MG/1
1 TABLET ORAL EVERY 4 HOURS PRN
Qty: 30 TABLET | Refills: 0 | Status: SHIPPED | OUTPATIENT
Start: 2019-09-13 | End: 2019-09-26

## 2019-09-16 NOTE — PROGRESS NOTES
Neck spasm    Patient is a 77-year-old female who was seen at the urgent care center on 9/5/2019 with a chief complaint of significant left scapular pain. She states the pain radiates up into her neck down to the left deltoid area.   She denied chest pain

## 2019-09-26 DIAGNOSIS — M62.838 MUSCLE SPASM OF LEFT SHOULDER: ICD-10-CM

## 2019-09-30 RX ORDER — HYDROCODONE BITARTRATE AND ACETAMINOPHEN 5; 325 MG/1; MG/1
1 TABLET ORAL EVERY 4 HOURS PRN
Qty: 30 TABLET | Refills: 0 | Status: SHIPPED | OUTPATIENT
Start: 2019-09-30 | End: 2020-10-08

## 2019-10-14 DIAGNOSIS — M62.838 MUSCLE SPASM OF LEFT SHOULDER: ICD-10-CM

## 2019-10-14 RX ORDER — ORPHENADRINE CITRATE 100 MG/1
TABLET, EXTENDED RELEASE ORAL
Qty: 10 TABLET | Refills: 0 | Status: SHIPPED | OUTPATIENT
Start: 2019-10-14 | End: 2020-10-08

## 2020-07-13 ENCOUNTER — TELEMEDICINE (OUTPATIENT)
Dept: FAMILY MEDICINE CLINIC | Facility: CLINIC | Age: 45
End: 2020-07-13

## 2020-07-13 DIAGNOSIS — N80.9 ENDOMETRIOSIS: Primary | ICD-10-CM

## 2020-07-13 PROCEDURE — 99213 OFFICE O/P EST LOW 20 MIN: CPT | Performed by: FAMILY MEDICINE

## 2020-07-13 RX ORDER — NAPROXEN 500 MG/1
500 TABLET ORAL 2 TIMES DAILY WITH MEALS
Qty: 60 TABLET | Refills: 1 | Status: SHIPPED | OUTPATIENT
Start: 2020-07-13 | End: 2020-08-12

## 2020-07-13 NOTE — PROGRESS NOTES
Subjective     HPI:   Lionel Monreal verbally consents to a Virtual/Telephone Check-In service on 07/13/20. Patient understands and accepts financial responsibility for any deductible, co-insurance and/or co-pays associated with this service.  This visit i

## 2020-10-13 ENCOUNTER — APPOINTMENT (OUTPATIENT)
Dept: LAB | Facility: HOSPITAL | Age: 45
End: 2020-10-13
Attending: OBSTETRICS & GYNECOLOGY
Payer: COMMERCIAL

## 2020-10-13 DIAGNOSIS — N94.6 DYSMENORRHEA: ICD-10-CM

## 2020-10-15 ENCOUNTER — ANESTHESIA EVENT (OUTPATIENT)
Dept: SURGERY | Facility: HOSPITAL | Age: 45
End: 2020-10-15
Payer: COMMERCIAL

## 2020-10-15 ENCOUNTER — HOSPITAL ENCOUNTER (OUTPATIENT)
Facility: HOSPITAL | Age: 45
Setting detail: HOSPITAL OUTPATIENT SURGERY
Discharge: HOME | End: 2020-10-15
Attending: OBSTETRICS & GYNECOLOGY | Admitting: OBSTETRICS & GYNECOLOGY
Payer: COMMERCIAL

## 2020-10-15 ENCOUNTER — ANESTHESIA (OUTPATIENT)
Dept: SURGERY | Facility: HOSPITAL | Age: 45
End: 2020-10-15
Payer: COMMERCIAL

## 2020-10-15 VITALS
WEIGHT: 147.69 LBS | BODY MASS INDEX: 24.61 KG/M2 | HEIGHT: 65 IN | OXYGEN SATURATION: 99 % | DIASTOLIC BLOOD PRESSURE: 71 MMHG | SYSTOLIC BLOOD PRESSURE: 111 MMHG | TEMPERATURE: 99 F | RESPIRATION RATE: 16 BRPM | HEART RATE: 77 BPM

## 2020-10-15 DIAGNOSIS — N94.6 DYSMENORRHEA: Primary | ICD-10-CM

## 2020-10-15 PROBLEM — N92.0 MENORRHAGIA: Status: ACTIVE | Noted: 2020-10-15

## 2020-10-15 PROCEDURE — 81025 URINE PREGNANCY TEST: CPT | Performed by: OBSTETRICS & GYNECOLOGY

## 2020-10-15 PROCEDURE — 0UT74ZZ RESECTION OF BILATERAL FALLOPIAN TUBES, PERCUTANEOUS ENDOSCOPIC APPROACH: ICD-10-PCS | Performed by: OBSTETRICS & GYNECOLOGY

## 2020-10-15 PROCEDURE — 0UT94ZZ RESECTION OF UTERUS, PERCUTANEOUS ENDOSCOPIC APPROACH: ICD-10-PCS | Performed by: OBSTETRICS & GYNECOLOGY

## 2020-10-15 PROCEDURE — 88307 TISSUE EXAM BY PATHOLOGIST: CPT | Performed by: OBSTETRICS & GYNECOLOGY

## 2020-10-15 RX ORDER — SCOLOPAMINE TRANSDERMAL SYSTEM 1 MG/1
1 PATCH, EXTENDED RELEASE TRANSDERMAL
Status: DISCONTINUED | OUTPATIENT
Start: 2020-10-15 | End: 2020-10-15

## 2020-10-15 RX ORDER — ONDANSETRON 4 MG/1
4 TABLET, FILM COATED ORAL EVERY 8 HOURS PRN
Status: DISCONTINUED | OUTPATIENT
Start: 2020-10-15 | End: 2020-10-15

## 2020-10-15 RX ORDER — ACETAMINOPHEN 500 MG
1000 TABLET ORAL ONCE
Status: DISCONTINUED | OUTPATIENT
Start: 2020-10-15 | End: 2020-10-15

## 2020-10-15 RX ORDER — METOCLOPRAMIDE HYDROCHLORIDE 5 MG/ML
10 INJECTION INTRAMUSCULAR; INTRAVENOUS AS NEEDED
Status: DISCONTINUED | OUTPATIENT
Start: 2020-10-15 | End: 2020-10-15

## 2020-10-15 RX ORDER — MEPERIDINE HYDROCHLORIDE 25 MG/ML
12.5 INJECTION INTRAMUSCULAR; INTRAVENOUS; SUBCUTANEOUS AS NEEDED
Status: DISCONTINUED | OUTPATIENT
Start: 2020-10-15 | End: 2020-10-15

## 2020-10-15 RX ORDER — SCOLOPAMINE TRANSDERMAL SYSTEM 1 MG/1
PATCH, EXTENDED RELEASE TRANSDERMAL
Status: DISCONTINUED
Start: 2020-10-15 | End: 2020-10-15

## 2020-10-15 RX ORDER — ONDANSETRON 8 MG/1
8 TABLET, ORALLY DISINTEGRATING ORAL EVERY 8 HOURS PRN
Qty: 10 TABLET | Refills: 1 | Status: SHIPPED | OUTPATIENT
Start: 2020-10-15 | End: 2021-11-08

## 2020-10-15 RX ORDER — DIPHENHYDRAMINE HYDROCHLORIDE 50 MG/ML
12.5 INJECTION INTRAMUSCULAR; INTRAVENOUS AS NEEDED
Status: DISCONTINUED | OUTPATIENT
Start: 2020-10-15 | End: 2020-10-15

## 2020-10-15 RX ORDER — ONDANSETRON 2 MG/ML
INJECTION INTRAMUSCULAR; INTRAVENOUS AS NEEDED
Status: DISCONTINUED | OUTPATIENT
Start: 2020-10-15 | End: 2020-10-15 | Stop reason: SURG

## 2020-10-15 RX ORDER — PHENAZOPYRIDINE HYDROCHLORIDE 200 MG/1
200 TABLET, FILM COATED ORAL ONCE
Status: COMPLETED | OUTPATIENT
Start: 2020-10-15 | End: 2020-10-15

## 2020-10-15 RX ORDER — SODIUM CHLORIDE, SODIUM LACTATE, POTASSIUM CHLORIDE, CALCIUM CHLORIDE 600; 310; 30; 20 MG/100ML; MG/100ML; MG/100ML; MG/100ML
INJECTION, SOLUTION INTRAVENOUS CONTINUOUS
Status: DISCONTINUED | OUTPATIENT
Start: 2020-10-15 | End: 2020-10-15

## 2020-10-15 RX ORDER — ROCURONIUM BROMIDE 10 MG/ML
INJECTION, SOLUTION INTRAVENOUS AS NEEDED
Status: DISCONTINUED | OUTPATIENT
Start: 2020-10-15 | End: 2020-10-15 | Stop reason: SURG

## 2020-10-15 RX ORDER — ACETAMINOPHEN 500 MG
1000 TABLET ORAL EVERY 6 HOURS PRN
Status: ON HOLD | COMMUNITY
End: 2020-10-26

## 2020-10-15 RX ORDER — MIDAZOLAM HYDROCHLORIDE 1 MG/ML
INJECTION INTRAMUSCULAR; INTRAVENOUS AS NEEDED
Status: DISCONTINUED | OUTPATIENT
Start: 2020-10-15 | End: 2020-10-15 | Stop reason: SURG

## 2020-10-15 RX ORDER — NEOSTIGMINE METHYLSULFATE 1 MG/ML
INJECTION INTRAVENOUS AS NEEDED
Status: DISCONTINUED | OUTPATIENT
Start: 2020-10-15 | End: 2020-10-15 | Stop reason: SURG

## 2020-10-15 RX ORDER — HYDROCODONE BITARTRATE AND ACETAMINOPHEN 5; 325 MG/1; MG/1
1-2 TABLET ORAL EVERY 4 HOURS PRN
Qty: 20 TABLET | Refills: 0 | Status: SHIPPED | OUTPATIENT
Start: 2020-10-15 | End: 2021-11-08

## 2020-10-15 RX ORDER — HYDROCODONE BITARTRATE AND ACETAMINOPHEN 5; 325 MG/1; MG/1
2 TABLET ORAL AS NEEDED
Status: COMPLETED | OUTPATIENT
Start: 2020-10-15 | End: 2020-10-15

## 2020-10-15 RX ORDER — HYDROMORPHONE HYDROCHLORIDE 1 MG/ML
INJECTION, SOLUTION INTRAMUSCULAR; INTRAVENOUS; SUBCUTANEOUS
Status: COMPLETED
Start: 2020-10-15 | End: 2020-10-15

## 2020-10-15 RX ORDER — HYDROCODONE BITARTRATE AND ACETAMINOPHEN 5; 325 MG/1; MG/1
1 TABLET ORAL AS NEEDED
Status: COMPLETED | OUTPATIENT
Start: 2020-10-15 | End: 2020-10-15

## 2020-10-15 RX ORDER — HYDROMORPHONE HYDROCHLORIDE 1 MG/ML
0.4 INJECTION, SOLUTION INTRAMUSCULAR; INTRAVENOUS; SUBCUTANEOUS EVERY 5 MIN PRN
Status: DISCONTINUED | OUTPATIENT
Start: 2020-10-15 | End: 2020-10-15

## 2020-10-15 RX ORDER — KETOROLAC TROMETHAMINE 30 MG/ML
INJECTION, SOLUTION INTRAMUSCULAR; INTRAVENOUS AS NEEDED
Status: DISCONTINUED | OUTPATIENT
Start: 2020-10-15 | End: 2020-10-15 | Stop reason: SURG

## 2020-10-15 RX ORDER — GLYCOPYRROLATE 0.2 MG/ML
INJECTION, SOLUTION INTRAMUSCULAR; INTRAVENOUS AS NEEDED
Status: DISCONTINUED | OUTPATIENT
Start: 2020-10-15 | End: 2020-10-15 | Stop reason: SURG

## 2020-10-15 RX ORDER — LIDOCAINE HYDROCHLORIDE 10 MG/ML
INJECTION, SOLUTION EPIDURAL; INFILTRATION; INTRACAUDAL; PERINEURAL AS NEEDED
Status: DISCONTINUED | OUTPATIENT
Start: 2020-10-15 | End: 2020-10-15 | Stop reason: SURG

## 2020-10-15 RX ORDER — NALOXONE HYDROCHLORIDE 0.4 MG/ML
80 INJECTION, SOLUTION INTRAMUSCULAR; INTRAVENOUS; SUBCUTANEOUS AS NEEDED
Status: DISCONTINUED | OUTPATIENT
Start: 2020-10-15 | End: 2020-10-15

## 2020-10-15 RX ORDER — ONDANSETRON 2 MG/ML
4 INJECTION INTRAMUSCULAR; INTRAVENOUS EVERY 8 HOURS PRN
Status: DISCONTINUED | OUTPATIENT
Start: 2020-10-15 | End: 2020-10-15

## 2020-10-15 RX ORDER — BUPIVACAINE HYDROCHLORIDE 5 MG/ML
INJECTION, SOLUTION EPIDURAL; INTRACAUDAL AS NEEDED
Status: DISCONTINUED | OUTPATIENT
Start: 2020-10-15 | End: 2020-10-15 | Stop reason: HOSPADM

## 2020-10-15 RX ORDER — DEXAMETHASONE SODIUM PHOSPHATE 4 MG/ML
VIAL (ML) INJECTION AS NEEDED
Status: DISCONTINUED | OUTPATIENT
Start: 2020-10-15 | End: 2020-10-15 | Stop reason: SURG

## 2020-10-15 RX ORDER — ONDANSETRON 2 MG/ML
4 INJECTION INTRAMUSCULAR; INTRAVENOUS AS NEEDED
Status: DISCONTINUED | OUTPATIENT
Start: 2020-10-15 | End: 2020-10-15

## 2020-10-15 RX ORDER — CEFAZOLIN SODIUM/WATER 2 G/20 ML
2 SYRINGE (ML) INTRAVENOUS ONCE
Status: COMPLETED | OUTPATIENT
Start: 2020-10-15 | End: 2020-10-15

## 2020-10-15 RX ADMIN — MIDAZOLAM HYDROCHLORIDE 2 MG: 1 INJECTION INTRAMUSCULAR; INTRAVENOUS at 15:14:00

## 2020-10-15 RX ADMIN — GLYCOPYRROLATE 0.6 MG: 0.2 INJECTION, SOLUTION INTRAMUSCULAR; INTRAVENOUS at 17:18:00

## 2020-10-15 RX ADMIN — ROCURONIUM BROMIDE 50 MG: 10 INJECTION, SOLUTION INTRAVENOUS at 15:18:00

## 2020-10-15 RX ADMIN — NEOSTIGMINE METHYLSULFATE 5 MG: 1 INJECTION INTRAVENOUS at 17:18:00

## 2020-10-15 RX ADMIN — KETOROLAC TROMETHAMINE 30 MG: 30 INJECTION, SOLUTION INTRAMUSCULAR; INTRAVENOUS at 17:16:00

## 2020-10-15 RX ADMIN — ONDANSETRON 4 MG: 2 INJECTION INTRAMUSCULAR; INTRAVENOUS at 17:02:00

## 2020-10-15 RX ADMIN — LIDOCAINE HYDROCHLORIDE 50 MG: 10 INJECTION, SOLUTION EPIDURAL; INFILTRATION; INTRACAUDAL; PERINEURAL at 15:18:00

## 2020-10-15 RX ADMIN — DEXAMETHASONE SODIUM PHOSPHATE 8 MG: 4 MG/ML VIAL (ML) INJECTION at 15:32:00

## 2020-10-15 RX ADMIN — GLYCOPYRROLATE 0.2 MG: 0.2 INJECTION, SOLUTION INTRAMUSCULAR; INTRAVENOUS at 15:52:00

## 2020-10-15 RX ADMIN — CEFAZOLIN SODIUM/WATER 2 G: 2 G/20 ML SYRINGE (ML) INTRAVENOUS at 15:34:00

## 2020-10-15 NOTE — ANESTHESIA PREPROCEDURE EVALUATION
PRE-OP EVALUATION    Patient Name: Anahi Calderon    Pre-op Diagnosis: DYSMENORRHEA    Procedure(s):  TOTAL LAPAROSCOPIC HYSTERECTOMY, BILATERAL SALPINGECTOMY, CYSTOSCOPY WITH MORCELLATION IN A BAG    Surgeon(s) and Role:     MD Mark Winkler reviewed. Airway      Mallampati: II  Mouth opening: >3 FB  TM distance: 4 - 6 cm  Neck ROM: full Cardiovascular    Cardiovascular exam normal.         Dental    No notable dental history.          Pulmonary    Pulmonary exam normal.

## 2020-10-15 NOTE — ANESTHESIA POSTPROCEDURE EVALUATION
[address] Patient Status:  Hospital Outpatient Surgery   Age/Gender 45year old female MRN [id number]   [facility] SURGERY Attending Jose Wilks MD   Hosp Day # 0 PCP Corry Gaona MD       Anesthesia Post-op

## 2020-10-15 NOTE — H&P
[facility]    History and Physical    Justina Sheets Patient Status:  Hospital Outpatient Surgery    1975 MRN [id number]   Location [address] [place] [address] Attending Juani Garcia MD   Hosp Day # 0 PCP Evelyn Barnes MD well-nourished. HENT:   Head: Normocephalic and atraumatic. Cardiovascular: Normal rate and regular rhythm. Pulmonary/Chest: Effort normal and breath sounds normal.   Neurological: She is alert and oriented to person, place, and time.    Psychiatric:

## 2020-10-15 NOTE — OPERATIVE REPORT
Pre-op Diagnosis: Menorrhagia, Dysmenorrhea     Post-op Diagnosis: Same as above,Adenomyosis   Procedure:  Total Laparoscopic Hysterectomy, bilateral salpingectomy, cystoscopy    Surgeon: Dr. Laura Carrillo  Assistant: Dr. Damari Lim intraabdominal pressure, insufflating with CO2 gas. Once adequate pneumoperitoneum was obtained, the Veress needle was then removed and replaced by a 5 mm trocar and sleeve.  Survey of patient's upper abdomen and pelvis revealed the above-mentioned finding maintain pneumoperitoneum. At this time, the vaginal cuff angles were then grasped and elevated and were reapproximated while incorporating the ipsilateral uterosacral ligaments with 0 PDS in an interrupted mattress fashion.   Multiple stitches were placed

## 2020-10-15 NOTE — INTERVAL H&P NOTE
Pre-op Diagnosis: DYSMENORRHEA    The above referenced H&P was reviewed by Sharif Trivedi MD on 10/15/2020, the patient was examined and no significant changes have occurred in the patient's condition since the H&P was performed.   I discussed with the pat

## 2020-10-15 NOTE — ANESTHESIA PROCEDURE NOTES
Airway  Date/Time: 10/15/2020 3:21 PM  Urgency: elective      General Information and Staff    Patient location during procedure: OR  Anesthesiologist: Solomon Lindsey MD  Resident/CRNA: Tato Osei CRNA  Performed: CRNA     Indications and Patient Condi

## 2020-10-26 ENCOUNTER — ANESTHESIA (OUTPATIENT)
Dept: SURGERY | Facility: HOSPITAL | Age: 45
End: 2020-10-26
Payer: COMMERCIAL

## 2020-10-26 ENCOUNTER — ANESTHESIA EVENT (OUTPATIENT)
Dept: SURGERY | Facility: HOSPITAL | Age: 45
End: 2020-10-26
Payer: COMMERCIAL

## 2020-10-26 ENCOUNTER — HOSPITAL ENCOUNTER (OUTPATIENT)
Facility: HOSPITAL | Age: 45
Setting detail: HOSPITAL OUTPATIENT SURGERY
Discharge: HOME | End: 2020-10-26
Attending: OBSTETRICS & GYNECOLOGY | Admitting: OBSTETRICS & GYNECOLOGY
Payer: COMMERCIAL

## 2020-10-26 VITALS
HEART RATE: 92 BPM | SYSTOLIC BLOOD PRESSURE: 116 MMHG | RESPIRATION RATE: 16 BRPM | WEIGHT: 147 LBS | OXYGEN SATURATION: 99 % | BODY MASS INDEX: 24 KG/M2 | TEMPERATURE: 99 F | DIASTOLIC BLOOD PRESSURE: 80 MMHG

## 2020-10-26 DIAGNOSIS — N94.89 PELVIC HEMATOMA, FEMALE: ICD-10-CM

## 2020-10-26 PROBLEM — N92.0 MENORRHAGIA: Status: RESOLVED | Noted: 2020-10-15 | Resolved: 2020-10-26

## 2020-10-26 PROCEDURE — 0W9J4ZZ DRAINAGE OF PELVIC CAVITY, PERCUTANEOUS ENDOSCOPIC APPROACH: ICD-10-PCS | Performed by: OBSTETRICS & GYNECOLOGY

## 2020-10-26 PROCEDURE — 81025 URINE PREGNANCY TEST: CPT | Performed by: OBSTETRICS & GYNECOLOGY

## 2020-10-26 PROCEDURE — 85027 COMPLETE CBC AUTOMATED: CPT | Performed by: OBSTETRICS & GYNECOLOGY

## 2020-10-26 PROCEDURE — 88305 TISSUE EXAM BY PATHOLOGIST: CPT | Performed by: OBSTETRICS & GYNECOLOGY

## 2020-10-26 PROCEDURE — 0UB14ZZ EXCISION OF LEFT OVARY, PERCUTANEOUS ENDOSCOPIC APPROACH: ICD-10-PCS | Performed by: OBSTETRICS & GYNECOLOGY

## 2020-10-26 DEVICE — KIT TISS CLOS TISSEEL 4ML: Type: IMPLANTABLE DEVICE | Site: PELVIS | Status: FUNCTIONAL

## 2020-10-26 RX ORDER — HYDROCODONE BITARTRATE AND ACETAMINOPHEN 5; 325 MG/1; MG/1
2 TABLET ORAL AS NEEDED
Status: DISCONTINUED | OUTPATIENT
Start: 2020-10-26 | End: 2020-10-26

## 2020-10-26 RX ORDER — ONDANSETRON 2 MG/ML
4 INJECTION INTRAMUSCULAR; INTRAVENOUS AS NEEDED
Status: DISCONTINUED | OUTPATIENT
Start: 2020-10-26 | End: 2020-10-26

## 2020-10-26 RX ORDER — METOCLOPRAMIDE HYDROCHLORIDE 5 MG/ML
10 INJECTION INTRAMUSCULAR; INTRAVENOUS AS NEEDED
Status: DISCONTINUED | OUTPATIENT
Start: 2020-10-26 | End: 2020-10-26

## 2020-10-26 RX ORDER — LIDOCAINE HYDROCHLORIDE 10 MG/ML
INJECTION, SOLUTION EPIDURAL; INFILTRATION; INTRACAUDAL; PERINEURAL AS NEEDED
Status: DISCONTINUED | OUTPATIENT
Start: 2020-10-26 | End: 2020-10-26 | Stop reason: SURG

## 2020-10-26 RX ORDER — ACETAMINOPHEN 325 MG/1
650 TABLET ORAL ONCE
Status: DISCONTINUED | OUTPATIENT
Start: 2020-10-26 | End: 2020-10-26

## 2020-10-26 RX ORDER — HYDROMORPHONE HYDROCHLORIDE 1 MG/ML
0.4 INJECTION, SOLUTION INTRAMUSCULAR; INTRAVENOUS; SUBCUTANEOUS EVERY 5 MIN PRN
Status: DISCONTINUED | OUTPATIENT
Start: 2020-10-26 | End: 2020-10-26

## 2020-10-26 RX ORDER — SODIUM CHLORIDE, SODIUM LACTATE, POTASSIUM CHLORIDE, CALCIUM CHLORIDE 600; 310; 30; 20 MG/100ML; MG/100ML; MG/100ML; MG/100ML
INJECTION, SOLUTION INTRAVENOUS CONTINUOUS
Status: DISCONTINUED | OUTPATIENT
Start: 2020-10-26 | End: 2020-10-26

## 2020-10-26 RX ORDER — SODIUM CHLORIDE 9 MG/ML
INJECTION, SOLUTION INTRAVENOUS CONTINUOUS PRN
Status: DISCONTINUED | OUTPATIENT
Start: 2020-10-26 | End: 2020-10-26 | Stop reason: SURG

## 2020-10-26 RX ORDER — BUPIVACAINE HYDROCHLORIDE 5 MG/ML
INJECTION, SOLUTION EPIDURAL; INTRACAUDAL AS NEEDED
Status: DISCONTINUED | OUTPATIENT
Start: 2020-10-26 | End: 2020-10-26 | Stop reason: HOSPADM

## 2020-10-26 RX ORDER — DOCUSATE SODIUM 100 MG/1
100 CAPSULE, LIQUID FILLED ORAL 2 TIMES DAILY PRN
Qty: 60 CAPSULE | Refills: 1 | Status: SHIPPED | OUTPATIENT
Start: 2020-10-26

## 2020-10-26 RX ORDER — NEOSTIGMINE METHYLSULFATE 1 MG/ML
INJECTION INTRAVENOUS AS NEEDED
Status: DISCONTINUED | OUTPATIENT
Start: 2020-10-26 | End: 2020-10-26 | Stop reason: SURG

## 2020-10-26 RX ORDER — HYDROCODONE BITARTRATE AND ACETAMINOPHEN 7.5; 325 MG/1; MG/1
1-2 TABLET ORAL EVERY 4 HOURS PRN
Qty: 30 TABLET | Refills: 0 | Status: SHIPPED | OUTPATIENT
Start: 2020-10-26 | End: 2021-11-08

## 2020-10-26 RX ORDER — KETAMINE HYDROCHLORIDE 50 MG/ML
INJECTION, SOLUTION, CONCENTRATE INTRAMUSCULAR; INTRAVENOUS AS NEEDED
Status: DISCONTINUED | OUTPATIENT
Start: 2020-10-26 | End: 2020-10-26 | Stop reason: SURG

## 2020-10-26 RX ORDER — ACETAMINOPHEN 500 MG
1000 TABLET ORAL ONCE AS NEEDED
Status: DISCONTINUED | OUTPATIENT
Start: 2020-10-26 | End: 2020-10-26

## 2020-10-26 RX ORDER — ONDANSETRON 2 MG/ML
INJECTION INTRAMUSCULAR; INTRAVENOUS AS NEEDED
Status: DISCONTINUED | OUTPATIENT
Start: 2020-10-26 | End: 2020-10-26 | Stop reason: SURG

## 2020-10-26 RX ORDER — DEXAMETHASONE SODIUM PHOSPHATE 4 MG/ML
VIAL (ML) INJECTION AS NEEDED
Status: DISCONTINUED | OUTPATIENT
Start: 2020-10-26 | End: 2020-10-26 | Stop reason: SURG

## 2020-10-26 RX ORDER — DIPHENHYDRAMINE HYDROCHLORIDE 50 MG/ML
12.5 INJECTION INTRAMUSCULAR; INTRAVENOUS AS NEEDED
Status: DISCONTINUED | OUTPATIENT
Start: 2020-10-26 | End: 2020-10-26

## 2020-10-26 RX ORDER — NAPROXEN 500 MG/1
500 TABLET ORAL 2 TIMES DAILY WITH MEALS
COMMUNITY
End: 2021-11-08

## 2020-10-26 RX ORDER — LABETALOL HYDROCHLORIDE 5 MG/ML
5 INJECTION, SOLUTION INTRAVENOUS EVERY 5 MIN PRN
Status: DISCONTINUED | OUTPATIENT
Start: 2020-10-26 | End: 2020-10-26

## 2020-10-26 RX ORDER — MIDAZOLAM HYDROCHLORIDE 1 MG/ML
1 INJECTION INTRAMUSCULAR; INTRAVENOUS EVERY 5 MIN PRN
Status: DISCONTINUED | OUTPATIENT
Start: 2020-10-26 | End: 2020-10-26

## 2020-10-26 RX ORDER — ACETAMINOPHEN 500 MG
1000 TABLET ORAL ONCE
Status: DISCONTINUED | OUTPATIENT
Start: 2020-10-26 | End: 2020-10-26 | Stop reason: HOSPADM

## 2020-10-26 RX ORDER — GLYCOPYRROLATE 0.2 MG/ML
INJECTION, SOLUTION INTRAMUSCULAR; INTRAVENOUS AS NEEDED
Status: DISCONTINUED | OUTPATIENT
Start: 2020-10-26 | End: 2020-10-26 | Stop reason: SURG

## 2020-10-26 RX ORDER — FERROUS SULFATE 325(65) MG
325 TABLET ORAL
Qty: 30 TABLET | Refills: 1 | Status: SHIPPED | OUTPATIENT
Start: 2020-10-26

## 2020-10-26 RX ORDER — ROCURONIUM BROMIDE 10 MG/ML
INJECTION, SOLUTION INTRAVENOUS AS NEEDED
Status: DISCONTINUED | OUTPATIENT
Start: 2020-10-26 | End: 2020-10-26 | Stop reason: SURG

## 2020-10-26 RX ORDER — CEFAZOLIN SODIUM/WATER 2 G/20 ML
2 SYRINGE (ML) INTRAVENOUS ONCE
Status: COMPLETED | OUTPATIENT
Start: 2020-10-26 | End: 2020-10-26

## 2020-10-26 RX ORDER — MEPERIDINE HYDROCHLORIDE 25 MG/ML
12.5 INJECTION INTRAMUSCULAR; INTRAVENOUS; SUBCUTANEOUS AS NEEDED
Status: DISCONTINUED | OUTPATIENT
Start: 2020-10-26 | End: 2020-10-26

## 2020-10-26 RX ORDER — HYDROCODONE BITARTRATE AND ACETAMINOPHEN 5; 325 MG/1; MG/1
1 TABLET ORAL AS NEEDED
Status: DISCONTINUED | OUTPATIENT
Start: 2020-10-26 | End: 2020-10-26

## 2020-10-26 RX ORDER — HYDROMORPHONE HYDROCHLORIDE 1 MG/ML
INJECTION, SOLUTION INTRAMUSCULAR; INTRAVENOUS; SUBCUTANEOUS
Status: COMPLETED
Start: 2020-10-26 | End: 2020-10-26

## 2020-10-26 RX ORDER — NALOXONE HYDROCHLORIDE 0.4 MG/ML
80 INJECTION, SOLUTION INTRAMUSCULAR; INTRAVENOUS; SUBCUTANEOUS AS NEEDED
Status: DISCONTINUED | OUTPATIENT
Start: 2020-10-26 | End: 2020-10-26

## 2020-10-26 RX ADMIN — NEOSTIGMINE METHYLSULFATE 4 MG: 1 INJECTION INTRAVENOUS at 19:07:00

## 2020-10-26 RX ADMIN — SODIUM CHLORIDE: 9 INJECTION, SOLUTION INTRAVENOUS at 18:34:00

## 2020-10-26 RX ADMIN — CEFAZOLIN SODIUM/WATER 2 G: 2 G/20 ML SYRINGE (ML) INTRAVENOUS at 18:02:00

## 2020-10-26 RX ADMIN — SODIUM CHLORIDE, SODIUM LACTATE, POTASSIUM CHLORIDE, CALCIUM CHLORIDE: 600; 310; 30; 20 INJECTION, SOLUTION INTRAVENOUS at 18:57:00

## 2020-10-26 RX ADMIN — ROCURONIUM BROMIDE 10 MG: 10 INJECTION, SOLUTION INTRAVENOUS at 18:53:00

## 2020-10-26 RX ADMIN — KETAMINE HYDROCHLORIDE 25 MG: 50 INJECTION, SOLUTION, CONCENTRATE INTRAMUSCULAR; INTRAVENOUS at 17:57:00

## 2020-10-26 RX ADMIN — ROCURONIUM BROMIDE 35 MG: 10 INJECTION, SOLUTION INTRAVENOUS at 17:57:00

## 2020-10-26 RX ADMIN — ROCURONIUM BROMIDE 5 MG: 10 INJECTION, SOLUTION INTRAVENOUS at 18:35:00

## 2020-10-26 RX ADMIN — DEXAMETHASONE SODIUM PHOSPHATE 4 MG: 4 MG/ML VIAL (ML) INJECTION at 17:57:00

## 2020-10-26 RX ADMIN — LIDOCAINE HYDROCHLORIDE 50 MG: 10 INJECTION, SOLUTION EPIDURAL; INFILTRATION; INTRACAUDAL; PERINEURAL at 17:57:00

## 2020-10-26 RX ADMIN — GLYCOPYRROLATE 0.8 MG: 0.2 INJECTION, SOLUTION INTRAMUSCULAR; INTRAVENOUS at 19:07:00

## 2020-10-26 RX ADMIN — SODIUM CHLORIDE, SODIUM LACTATE, POTASSIUM CHLORIDE, CALCIUM CHLORIDE: 600; 310; 30; 20 INJECTION, SOLUTION INTRAVENOUS at 17:55:00

## 2020-10-26 RX ADMIN — ROCURONIUM BROMIDE 10 MG: 10 INJECTION, SOLUTION INTRAVENOUS at 18:18:00

## 2020-10-26 RX ADMIN — ONDANSETRON 4 MG: 2 INJECTION INTRAMUSCULAR; INTRAVENOUS at 19:06:00

## 2020-10-26 NOTE — ANESTHESIA PROCEDURE NOTES
Airway  Urgency: elective    Airway not difficult    General Information and Staff    Patient location during procedure: OR  Anesthesiologist: Adelaide De Santiago MD  Performed: anesthesiologist     Indications and Patient Condition  Indications for airway manag

## 2020-10-26 NOTE — OPERATIVE REPORT
PREOPERATIVE DIAGNOSIS:  Post-operative Pelvic Hematoma    POSTOPERATIVE DIAGNOSIS:  Same as above  PROCEDURE PERFORMED:  Laparoscopy evacuation of pelvic hematoma, left ovarian cystectomy    ASSISTANT:  None    ANESTHESIA:  General.    COMPLICATIONS:  Non Lateral to these vessels, 0.5% Sensorcaine was injected, 5-mm incisions made and 5-mm trocar and sleeves introduced under direct visualization without any difficulty.      At this time a large pelvic hematoma was noted, measuring 8 cm and it was evacuated

## 2020-10-26 NOTE — H&P
[facility]    History and Physical    Lionel Jocelin Patient Status:  Outpatient in a Bed    1975 MRN [id number]   Location [address] [place] [address] Attending Dylan Springer MD   Hosp Day # 0 PCP Ruben Newsome MD     Date: mouth every 8 (eight) hours as needed for Nausea.     •  EPINEPHrine (EPIPEN 2-SREEDHAR) 0.3 MG/0.3ML Injection Solution Auto-injector, INJECT 0.3 ML INTO THIGH AS DIRECTED ONE TIME AS DIRECTED        Review of Systems:   Review of Systems    Physical Exam:   Vi

## 2020-10-26 NOTE — ANESTHESIA PREPROCEDURE EVALUATION
'[id number] EVALUATION    Patient Name: Lionel Monreal    Pre-op Diagnosis: Pelvic hematoma, female [N94.89]    Procedure(s):  laparoscopic evacuation of pelvic hematoma  Left ovarian cystectomy Cardiovascular    Cardiovascular exam normal.  Rhythm: regular  Rate: normal     Dental    No notable dental history. Pulmonary    Pulmonary exam normal.  Breath sounds clear to auscultation bilaterally.                Other findings            ASA:

## 2020-10-27 NOTE — ANESTHESIA POSTPROCEDURE EVALUATION
[address] Patient Status:  Outpatient in a Bed   Age/Gender 45year old female MRN [id number]   [facility] SURGERY Attending Tashi Gregory MD   Hosp Day # 0 PCP Tara Fisher MD       Anesthesia Post-op Note

## 2021-09-11 ENCOUNTER — PATIENT MESSAGE (OUTPATIENT)
Dept: FAMILY MEDICINE CLINIC | Facility: CLINIC | Age: 46
End: 2021-09-11

## 2021-09-13 NOTE — TELEPHONE ENCOUNTER
From: Lionel Monreal  To: Ruben Newsome MD  Sent: 9/11/2021 1:49 PM CDT  Subject: Non-Urgent Emilee Spore Dr. Arlen Nguyen,    I am wondering if the J&J Covid-19 vaccine would be safe for me to take due to thrombocytopenia that has popped up

## 2021-11-08 ENCOUNTER — HOSPITAL ENCOUNTER (OUTPATIENT)
Age: 46
Discharge: HOME | End: 2021-11-08
Payer: COMMERCIAL

## 2021-11-08 ENCOUNTER — APPOINTMENT (OUTPATIENT)
Dept: GENERAL RADIOLOGY | Age: 46
End: 2021-11-08
Attending: NURSE PRACTITIONER
Payer: COMMERCIAL

## 2021-11-08 ENCOUNTER — TELEPHONE (OUTPATIENT)
Dept: FAMILY MEDICINE CLINIC | Facility: CLINIC | Age: 46
End: 2021-11-08

## 2021-11-08 VITALS
BODY MASS INDEX: 23.99 KG/M2 | HEART RATE: 79 BPM | WEIGHT: 144 LBS | OXYGEN SATURATION: 99 % | HEIGHT: 65 IN | RESPIRATION RATE: 22 BRPM | SYSTOLIC BLOOD PRESSURE: 106 MMHG | DIASTOLIC BLOOD PRESSURE: 78 MMHG | TEMPERATURE: 99 F

## 2021-11-08 DIAGNOSIS — B34.9 VIRAL SYNDROME: ICD-10-CM

## 2021-11-08 DIAGNOSIS — U07.1 COVID-19: Primary | ICD-10-CM

## 2021-11-08 PROCEDURE — U0002 COVID-19 LAB TEST NON-CDC: HCPCS | Performed by: NURSE PRACTITIONER

## 2021-11-08 PROCEDURE — 71046 X-RAY EXAM CHEST 2 VIEWS: CPT | Performed by: NURSE PRACTITIONER

## 2021-11-08 PROCEDURE — 99203 OFFICE O/P NEW LOW 30 MIN: CPT | Performed by: NURSE PRACTITIONER

## 2021-11-08 NOTE — ED INITIAL ASSESSMENT (HPI)
Onset of symptoms last Tuesday. Chill and body aches Wednesday and Thursday. Now cough is worsening and pt is short of breath, states her heart rate increases. With exhertion HR is 140, but oxygen does not drop below 94. Pt is not vaccinated.  States covid

## 2021-11-08 NOTE — ED PROVIDER NOTES
Patient Seen in: [facility]      History   Patient presents with:  Cough/URI  Shortness Of Breath    Stated Complaint: shortness of breathe with cough     Subjective:   19-year-old female presents to the IC with cough that has become worsening negative. Positive for stated complaint: shortness of breathe with cough   Other systems are as noted in HPI. Constitutional and vital signs reviewed. All other systems reviewed and negative except as noted above.     Physical Exam     ED Triage CHEST PA + LAT CHEST (CPT=71046)  INDICATIONS:  shortness of breathe with cough  COMPARISON:  [facility] , XR, XR CHEST PA + LAT CHEST (AJJ=[id number]), 3/03/2017, 12:02 PM.  TECHNIQUE:  PA and lateral chest radiographs were obtained.   PATIENT STATED HISTORY: (As t

## 2021-11-09 NOTE — TELEPHONE ENCOUNTER
Condition update:  Started body aches Tues. 11/2/21. tested + Wed. 11/3/2021.  Arcelia Hernandez has been +  As of today body aches gone. Taking Advil/Tylenol PRN. Cough persists, but mild now-no mucus production. Feels like \"a head cold\". Denies fevers/CP.

## 2021-11-21 ENCOUNTER — TELEPHONE (OUTPATIENT)
Dept: FAMILY MEDICINE CLINIC | Facility: CLINIC | Age: 46
End: 2021-11-21

## 2021-12-04 ENCOUNTER — HOSPITAL ENCOUNTER (OUTPATIENT)
Dept: MAMMOGRAPHY | Age: 46
Discharge: HOME | End: 2021-12-04
Attending: FAMILY MEDICINE
Payer: COMMERCIAL

## 2021-12-04 DIAGNOSIS — Z12.31 BREAST CANCER SCREENING BY MAMMOGRAM: ICD-10-CM

## 2021-12-04 DIAGNOSIS — Z12.31 SCREENING MAMMOGRAM FOR BREAST CANCER: ICD-10-CM

## 2021-12-04 DIAGNOSIS — Z12.31 ENCOUNTER FOR SCREENING MAMMOGRAM FOR MALIGNANT NEOPLASM OF BREAST: ICD-10-CM

## 2021-12-04 PROCEDURE — 77067 SCR MAMMO BI INCL CAD: CPT | Performed by: OBSTETRICS & GYNECOLOGY

## 2021-12-04 PROCEDURE — 77063 BREAST TOMOSYNTHESIS BI: CPT | Performed by: OBSTETRICS & GYNECOLOGY

## 2022-02-01 RX ORDER — ALPRAZOLAM 0.25 MG/1
TABLET ORAL
Qty: 30 TABLET | Refills: 0 | Status: SHIPPED | OUTPATIENT
Start: 2022-02-01

## 2022-02-01 NOTE — TELEPHONE ENCOUNTER
LOV 07/13/2020. No future appt on schedule   Last refill 10/31/2021 # 30 0 refills.    Please authorize if appropriate

## 2022-05-16 ENCOUNTER — PATIENT MESSAGE (OUTPATIENT)
Dept: FAMILY MEDICINE CLINIC | Facility: CLINIC | Age: 47
End: 2022-05-16

## 2022-05-27 ENCOUNTER — TELEPHONE (OUTPATIENT)
Dept: FAMILY MEDICINE CLINIC | Facility: CLINIC | Age: 47
End: 2022-05-27

## 2022-05-27 NOTE — TELEPHONE ENCOUNTER
Pt calling regarding new medication that she was supposed to receive. Pt was seen in the clinic yesterday with Dr. Floyd Isaac and was prescribed two new medications. Pt states that she isn't quite sure of what two new medications they were but she was told they were going to be ready today at her preferred pharmacy, when she went to pick them up there was no medication submitted, per her chart there were no medications submitted. Pt states that she will be leaving to go out of town and was hoping to have this medication done and submitted by today or sometime early tomorrow. Pt requesting call back regarding this matter.      Please advise

## 2022-05-27 NOTE — TELEPHONE ENCOUNTER
No orders in the system, and OV note from 5/26/22 is not yet completed. Will route to  for input. S/w Derrick Stephens. Informed her that  is currently out of the ofc, but will route message to him. She sts one of the medications was Imitrex for migraines.  She sts the other medication was going to be a medication for anxiety

## 2022-06-16 DIAGNOSIS — F32.A ANXIETY AND DEPRESSION: ICD-10-CM

## 2022-06-16 DIAGNOSIS — F41.9 ANXIETY AND DEPRESSION: ICD-10-CM

## 2022-06-17 RX ORDER — ALPRAZOLAM 0.25 MG/1
TABLET ORAL
Qty: 30 TABLET | Refills: 0 | Status: SHIPPED | OUTPATIENT
Start: 2022-06-17

## 2022-07-26 DIAGNOSIS — F41.9 ANXIETY AND DEPRESSION: ICD-10-CM

## 2022-07-26 DIAGNOSIS — F32.A ANXIETY AND DEPRESSION: ICD-10-CM

## 2022-07-26 RX ORDER — ALPRAZOLAM 0.25 MG/1
TABLET ORAL
Qty: 30 TABLET | Refills: 0 | Status: SHIPPED | OUTPATIENT
Start: 2022-07-26

## 2022-09-06 DIAGNOSIS — F41.9 ANXIETY AND DEPRESSION: ICD-10-CM

## 2022-09-06 DIAGNOSIS — F32.A ANXIETY AND DEPRESSION: ICD-10-CM

## 2022-09-07 RX ORDER — ALPRAZOLAM 0.25 MG/1
TABLET ORAL
Qty: 30 TABLET | Refills: 0 | Status: SHIPPED | OUTPATIENT
Start: 2022-09-07

## 2022-09-13 DIAGNOSIS — G43.009 MIGRAINE WITHOUT AURA AND WITHOUT STATUS MIGRAINOSUS, NOT INTRACTABLE: ICD-10-CM

## 2022-09-15 RX ORDER — SUMATRIPTAN 50 MG/1
TABLET, FILM COATED ORAL
Qty: 9 TABLET | Refills: 1 | Status: SHIPPED | OUTPATIENT
Start: 2022-09-15

## 2022-09-15 NOTE — TELEPHONE ENCOUNTER
Please see pended medication.     Please sign and approved if appropriate       Last ov 8-8-22      Last refill  SUMAtriptan (IMITREX) 50 MG Oral Tab 9 tablet 1 5/29/2022       Update in 3 weeks and see again in 6 weeks

## 2022-10-07 DIAGNOSIS — F41.9 ANXIETY AND DEPRESSION: ICD-10-CM

## 2022-10-07 DIAGNOSIS — F32.A ANXIETY AND DEPRESSION: ICD-10-CM

## 2022-10-07 RX ORDER — ALPRAZOLAM 0.25 MG/1
TABLET ORAL
Qty: 30 TABLET | Refills: 1 | Status: SHIPPED | OUTPATIENT
Start: 2022-10-07

## 2022-10-07 NOTE — TELEPHONE ENCOUNTER
ALPRAZOLAM 0.25MG TABLETS    Please see pended medications. Please sign if appropriate.       Thank you      Last OV: 09/27/2022      Last refill: 09/07/2022 for 30 tabs      Upcoming appt is scheduled for 03/27/2023

## 2022-11-25 DIAGNOSIS — G43.009 MIGRAINE WITHOUT AURA AND WITHOUT STATUS MIGRAINOSUS, NOT INTRACTABLE: ICD-10-CM

## 2022-11-26 RX ORDER — SUMATRIPTAN 50 MG/1
TABLET, FILM COATED ORAL
Qty: 9 TABLET | Refills: 1 | Status: SHIPPED | OUTPATIENT
Start: 2022-11-26

## 2022-12-04 DIAGNOSIS — F41.9 ANXIETY AND DEPRESSION: ICD-10-CM

## 2022-12-04 DIAGNOSIS — F32.A ANXIETY AND DEPRESSION: ICD-10-CM

## 2022-12-05 RX ORDER — ALPRAZOLAM 0.25 MG/1
TABLET ORAL
Qty: 30 TABLET | Refills: 0 | Status: SHIPPED | OUTPATIENT
Start: 2022-12-05

## 2023-01-06 DIAGNOSIS — F32.A ANXIETY AND DEPRESSION: ICD-10-CM

## 2023-01-06 DIAGNOSIS — F41.9 ANXIETY AND DEPRESSION: ICD-10-CM

## 2023-01-09 RX ORDER — ALPRAZOLAM 0.25 MG/1
TABLET ORAL
Qty: 30 TABLET | Refills: 1 | Status: SHIPPED | OUTPATIENT
Start: 2023-01-09

## 2023-03-27 ENCOUNTER — OFFICE VISIT (OUTPATIENT)
Dept: FAMILY MEDICINE CLINIC | Facility: CLINIC | Age: 48
End: 2023-03-27
Payer: COMMERCIAL

## 2023-03-27 VITALS
HEART RATE: 78 BPM | DIASTOLIC BLOOD PRESSURE: 74 MMHG | WEIGHT: 149 LBS | HEIGHT: 65 IN | TEMPERATURE: 97 F | SYSTOLIC BLOOD PRESSURE: 106 MMHG | RESPIRATION RATE: 14 BRPM | BODY MASS INDEX: 24.83 KG/M2

## 2023-03-27 DIAGNOSIS — Z00.00 ROUTINE GENERAL MEDICAL EXAMINATION AT A HEALTH CARE FACILITY: ICD-10-CM

## 2023-03-27 DIAGNOSIS — Z12.31 ENCOUNTER FOR SCREENING MAMMOGRAM FOR MALIGNANT NEOPLASM OF BREAST: ICD-10-CM

## 2023-03-27 DIAGNOSIS — F41.9 ANXIETY AND DEPRESSION: ICD-10-CM

## 2023-03-27 DIAGNOSIS — Z12.11 SCREENING FOR MALIGNANT NEOPLASM OF COLON: Primary | ICD-10-CM

## 2023-03-27 DIAGNOSIS — F32.A ANXIETY AND DEPRESSION: ICD-10-CM

## 2023-03-27 DIAGNOSIS — M54.2 NECK PAIN ON RIGHT SIDE: ICD-10-CM

## 2023-03-27 PROCEDURE — 3078F DIAST BP <80 MM HG: CPT | Performed by: FAMILY MEDICINE

## 2023-03-27 PROCEDURE — 3008F BODY MASS INDEX DOCD: CPT | Performed by: FAMILY MEDICINE

## 2023-03-27 PROCEDURE — 99396 PREV VISIT EST AGE 40-64: CPT | Performed by: FAMILY MEDICINE

## 2023-03-27 PROCEDURE — 3074F SYST BP LT 130 MM HG: CPT | Performed by: FAMILY MEDICINE

## 2023-03-27 RX ORDER — METHYLPREDNISOLONE 4 MG/1
TABLET ORAL
Qty: 21 EACH | Refills: 0 | Status: SHIPPED | OUTPATIENT
Start: 2023-03-27

## 2023-03-27 RX ORDER — DESVENLAFAXINE 25 MG/1
25 TABLET, EXTENDED RELEASE ORAL DAILY
Qty: 30 TABLET | Refills: 1 | Status: SHIPPED | OUTPATIENT
Start: 2023-03-27

## 2023-03-28 ENCOUNTER — PATIENT MESSAGE (OUTPATIENT)
Dept: FAMILY MEDICINE CLINIC | Facility: CLINIC | Age: 48
End: 2023-03-28

## 2023-03-28 NOTE — TELEPHONE ENCOUNTER
From: uDnia Andrew  To: Marylen Oman, MD  Sent: 3/28/2023 3:32 PM CDT  Subject: Angel Gravely Dr Collette Royal had mentioned I needed an MRI. [facility] just call central [facility]?     Thank you,  Dasia

## 2023-03-28 NOTE — TELEPHONE ENCOUNTER
Pt evaluated on 3/27/23 by Dr. Ivan Gregory. Pt was questioning if MRI would be ordered? I see mammogram but not an MRI. Is mammogram the only imaging study that will be ordered?

## 2023-03-30 NOTE — MR AVS SNAPSHOT
[address], [facility] [address] [place] [address]               Thank you for choosing us for your health care visit with Stephania Christine MD.  We are glad to serve you and happy to provide you with this damon prescription must be signed by the provider, picked up in our office and physically brought to the pharmacy. A 30 day supply with no refills is the maximum allowed. ? If your prescription is due for a refill, you may be due for a follow up appointment. understanding in the matter.            Allergies as of Apr 21, 2017     Iodine Solution [Povidone Iodine] Swelling    Shellfish-Derived Products Anaphylaxis                   Current Medications          This list is accurate as of: 4/21/17  9:13 AM.  Laura Moeller CINTIA for > 1 year Watchman evaluation

## 2023-04-01 ENCOUNTER — PATIENT MESSAGE (OUTPATIENT)
Dept: FAMILY MEDICINE CLINIC | Facility: CLINIC | Age: 48
End: 2023-04-01

## 2023-04-03 NOTE — TELEPHONE ENCOUNTER
S/w Dany Mack     I informed the pt that Dr. Althea Leach has sent in a RX for voltaren to her local pharmacy. Pt voiced understanding and agreed to treatment plan.

## 2023-04-06 ENCOUNTER — PATIENT MESSAGE (OUTPATIENT)
Dept: FAMILY MEDICINE CLINIC | Facility: CLINIC | Age: 48
End: 2023-04-06

## 2023-04-06 RX ORDER — PREDNISONE 10 MG/1
TABLET ORAL
Qty: 22 TABLET | Refills: 0 | Status: SHIPPED | OUTPATIENT
Start: 2023-04-06

## 2023-04-06 NOTE — TELEPHONE ENCOUNTER
We could try her on prednisone 10 mg, 40 mg daily x4 days then 30 x 1 day then 20 x 1 day then 10 x 1 day.   #22 and no refill

## 2023-04-19 ENCOUNTER — TELEPHONE (OUTPATIENT)
Dept: ADMINISTRATIVE | Age: 48
End: 2023-04-19

## 2023-04-19 NOTE — TELEPHONE ENCOUNTER
Hello,     We received a denial for your patients cervical mri. However, [employer] does allow a peer to peer for approval.      Please call them at [phone] option 4 by 5.2. Case# [id number]      Please advise on final determination so we can update the case and patient. Rationale:     Coverage for this service has been denied for the following reason(s):   Based on [employer] Guidelines Section(s): Neck (Cervical Spine) Pain without and   with Neurological Features (Including Stenosis) (SP 3.1) and 1.0 General Guidelines, we cannot   approve this request. Your healthcare provider told us that you have neck pain. The request   cannot be approved because:  Imaging requires six weeks of provider directed treatment to be completed. This must have been   completed in the past three months without improved symptoms. Contact (via office visit, phone,   email, or messaging) must occur after the treatment is completed. This has not been met because: You have not completed six weeks of provider directed treatment. The provider directed treatment did not occur within the last three months. Page 3 of 22 April 2020 UM01 Template  There was no contact with your provider after completing treatment. Please advise. Kyle Montiel   Referral Specialist  Centralized Prior Authorizations/Referrals  [place] [address] [facility]

## 2023-04-20 ENCOUNTER — TELEPHONE (OUTPATIENT)
Dept: FAMILY MEDICINE CLINIC | Facility: CLINIC | Age: 48
End: 2023-04-20

## 2023-04-20 DIAGNOSIS — M54.2 NECK PAIN ON RIGHT SIDE: Primary | ICD-10-CM

## 2023-04-20 RX ORDER — TRAMADOL HYDROCHLORIDE 50 MG/1
50 TABLET ORAL EVERY 6 HOURS PRN
Qty: 30 TABLET | Refills: 0 | Status: SHIPPED | OUTPATIENT
Start: 2023-04-20

## 2023-04-20 NOTE — TELEPHONE ENCOUNTER
Tc to pt to advise her that MRI was approved via peer to peer. She voiced understanding but is asking if you could rx something for the pain until we have answers? Has tried diclofenac, medrol, and steroid taper recently without much relief. Asking for something stronger to take PRN. Please advise. Thanks.

## 2023-04-20 NOTE — TELEPHONE ENCOUNTER
Janett CONDE s/w Dr Marita Smith. He confirms we can set peer to peer for 430p today. I called [phone]    S/w Lizeth there. Confirms Dr Henna Alvarez can do peer to peer today at 430pm with Dr Derrell Marie. Back line # provided.     Case# [id number]

## 2023-04-20 NOTE — TELEPHONE ENCOUNTER
Dr Valentín Duarte has completed peer to peer. MRI has been approved effective date 4/20-10/17/23  Confirmation # [id number]    Can we update referral to reflect this? I will call pt to advise of approval.  Thanks!

## 2023-05-03 ENCOUNTER — HOSPITAL ENCOUNTER (OUTPATIENT)
Dept: MAMMOGRAPHY | Age: 48
Discharge: HOME | End: 2023-05-03
Attending: FAMILY MEDICINE
Payer: COMMERCIAL

## 2023-05-03 ENCOUNTER — LAB ENCOUNTER (OUTPATIENT)
Dept: LAB | Age: 48
End: 2023-05-03
Attending: FAMILY MEDICINE
Payer: COMMERCIAL

## 2023-05-03 ENCOUNTER — HOSPITAL ENCOUNTER (OUTPATIENT)
Dept: MRI IMAGING | Age: 48
Discharge: HOME | End: 2023-05-03
Attending: FAMILY MEDICINE
Payer: COMMERCIAL

## 2023-05-03 DIAGNOSIS — Z00.00 ROUTINE GENERAL MEDICAL EXAMINATION AT A HEALTH CARE FACILITY: ICD-10-CM

## 2023-05-03 DIAGNOSIS — M54.2 NECK PAIN ON RIGHT SIDE: ICD-10-CM

## 2023-05-03 DIAGNOSIS — Z12.31 ENCOUNTER FOR SCREENING MAMMOGRAM FOR MALIGNANT NEOPLASM OF BREAST: ICD-10-CM

## 2023-05-03 LAB
ALBUMIN SERPL-MCNC: 4 G/DL (ref 3.4–5)
ALBUMIN/GLOB SERPL: 1.1 {RATIO} (ref 1–2)
ALP LIVER SERPL-CCNC: 39 U/L
ALT SERPL-CCNC: 28 U/L
ANION GAP SERPL CALC-SCNC: 2 MMOL/L (ref 0–18)
AST SERPL-CCNC: 14 U/L (ref 15–37)
BASOPHILS # BLD AUTO: 0.02 X10(3) UL (ref 0–0.2)
BASOPHILS NFR BLD AUTO: 0.4 %
BILIRUB SERPL-MCNC: 0.4 MG/DL (ref 0.1–2)
BILIRUB UR QL STRIP.AUTO: NEGATIVE
BUN BLD-MCNC: 12 MG/DL (ref 7–18)
CALCIUM BLD-MCNC: 9.2 MG/DL (ref 8.5–10.1)
CHLORIDE SERPL-SCNC: 107 MMOL/L (ref 98–112)
CHOLEST SERPL-MCNC: 219 MG/DL (ref ?–200)
CLARITY UR REFRACT.AUTO: CLEAR
CO2 SERPL-SCNC: 29 MMOL/L (ref 21–32)
COLOR UR AUTO: YELLOW
CREAT BLD-MCNC: 0.67 MG/DL
EOSINOPHIL # BLD AUTO: 0.07 X10(3) UL (ref 0–0.7)
EOSINOPHIL NFR BLD AUTO: 1.5 %
ERYTHROCYTE [DISTWIDTH] IN BLOOD BY AUTOMATED COUNT: 13 %
FASTING PATIENT LIPID ANSWER: YES
FASTING STATUS PATIENT QL REPORTED: YES
GFR SERPLBLD BASED ON 1.73 SQ M-ARVRAT: 110 ML/MIN/1.73M2 (ref 60–?)
GLOBULIN PLAS-MCNC: 3.6 G/DL (ref 2.8–4.4)
GLUCOSE BLD-MCNC: 96 MG/DL (ref 70–99)
GLUCOSE UR STRIP.AUTO-MCNC: NEGATIVE MG/DL
HCT VFR BLD AUTO: 37.2 %
HDLC SERPL-MCNC: 80 MG/DL (ref 40–59)
HGB BLD-MCNC: 11.9 G/DL
IMM GRANULOCYTES # BLD AUTO: 0.01 X10(3) UL (ref 0–1)
IMM GRANULOCYTES NFR BLD: 0.2 %
KETONES UR STRIP.AUTO-MCNC: NEGATIVE MG/DL
LDLC SERPL CALC-MCNC: 122 MG/DL (ref ?–100)
LEUKOCYTE ESTERASE UR QL STRIP.AUTO: NEGATIVE
LYMPHOCYTES # BLD AUTO: 1.51 X10(3) UL (ref 1–4)
LYMPHOCYTES NFR BLD AUTO: 31.9 %
MCH RBC QN AUTO: 31.5 PG (ref 26–34)
MCHC RBC AUTO-ENTMCNC: 32 G/DL (ref 31–37)
MCV RBC AUTO: 98.4 FL
MONOCYTES # BLD AUTO: 0.3 X10(3) UL (ref 0.1–1)
MONOCYTES NFR BLD AUTO: 6.3 %
NEUTROPHILS # BLD AUTO: 2.83 X10 (3) UL (ref 1.5–7.7)
NEUTROPHILS # BLD AUTO: 2.83 X10(3) UL (ref 1.5–7.7)
NEUTROPHILS NFR BLD AUTO: 59.7 %
NITRITE UR QL STRIP.AUTO: NEGATIVE
NONHDLC SERPL-MCNC: 139 MG/DL (ref ?–130)
OSMOLALITY SERPL CALC.SUM OF ELEC: 286 MOSM/KG (ref 275–295)
PH UR STRIP.AUTO: 5 [PH] (ref 5–8)
PLATELET # BLD AUTO: 133 10(3)UL (ref 150–450)
POTASSIUM SERPL-SCNC: 3.9 MMOL/L (ref 3.5–5.1)
PROT SERPL-MCNC: 7.6 G/DL (ref 6.4–8.2)
PROT UR STRIP.AUTO-MCNC: NEGATIVE MG/DL
RBC # BLD AUTO: 3.78 X10(6)UL
SODIUM SERPL-SCNC: 138 MMOL/L (ref 136–145)
SP GR UR STRIP.AUTO: 1.01 (ref 1–1.03)
TRIGL SERPL-MCNC: 100 MG/DL (ref 30–149)
UROBILINOGEN UR STRIP.AUTO-MCNC: <2 MG/DL
VLDLC SERPL CALC-MCNC: 18 MG/DL (ref 0–30)
WBC # BLD AUTO: 4.7 X10(3) UL (ref 4–11)

## 2023-05-03 PROCEDURE — 77063 BREAST TOMOSYNTHESIS BI: CPT | Performed by: FAMILY MEDICINE

## 2023-05-03 PROCEDURE — 80053 COMPREHEN METABOLIC PANEL: CPT

## 2023-05-03 PROCEDURE — 80061 LIPID PANEL: CPT

## 2023-05-03 PROCEDURE — 36415 COLL VENOUS BLD VENIPUNCTURE: CPT

## 2023-05-03 PROCEDURE — 72141 MRI NECK SPINE W/O DYE: CPT | Performed by: FAMILY MEDICINE

## 2023-05-03 PROCEDURE — 85025 COMPLETE CBC W/AUTO DIFF WBC: CPT

## 2023-05-03 PROCEDURE — 81001 URINALYSIS AUTO W/SCOPE: CPT

## 2023-05-03 PROCEDURE — 77067 SCR MAMMO BI INCL CAD: CPT | Performed by: FAMILY MEDICINE

## 2023-05-04 DIAGNOSIS — M54.2 NECK PAIN ON RIGHT SIDE: ICD-10-CM

## 2023-05-04 RX ORDER — TRAMADOL HYDROCHLORIDE 50 MG/1
50 TABLET ORAL EVERY 6 HOURS PRN
Qty: 30 TABLET | Refills: 0 | Status: CANCELLED | OUTPATIENT
Start: 2023-05-04

## 2023-05-12 ENCOUNTER — TELEPHONE (OUTPATIENT)
Dept: SURGERY | Facility: CLINIC | Age: 48
End: 2023-05-12

## 2023-05-12 ENCOUNTER — OFFICE VISIT (OUTPATIENT)
Dept: SURGERY | Facility: CLINIC | Age: 48
End: 2023-05-12
Payer: COMMERCIAL

## 2023-05-12 VITALS
BODY MASS INDEX: 24.83 KG/M2 | HEIGHT: 65 IN | DIASTOLIC BLOOD PRESSURE: 80 MMHG | WEIGHT: 149 LBS | HEART RATE: 72 BPM | SYSTOLIC BLOOD PRESSURE: 124 MMHG

## 2023-05-12 DIAGNOSIS — M54.12 CERVICAL RADICULOPATHY: Primary | ICD-10-CM

## 2023-05-12 PROCEDURE — 99204 OFFICE O/P NEW MOD 45 MIN: CPT | Performed by: NEUROLOGICAL SURGERY

## 2023-05-12 PROCEDURE — 3079F DIAST BP 80-89 MM HG: CPT | Performed by: NEUROLOGICAL SURGERY

## 2023-05-12 PROCEDURE — 3008F BODY MASS INDEX DOCD: CPT | Performed by: NEUROLOGICAL SURGERY

## 2023-05-12 PROCEDURE — 3074F SYST BP LT 130 MM HG: CPT | Performed by: NEUROLOGICAL SURGERY

## 2023-05-12 RX ORDER — TRAMADOL HYDROCHLORIDE 100 MG/1
100 TABLET, COATED ORAL EVERY 6 HOURS PRN
Qty: 120 TABLET | Refills: 0 | Status: SHIPPED | OUTPATIENT
Start: 2023-05-12 | End: 2023-06-11

## 2023-05-12 NOTE — PROGRESS NOTES
New patient:  Reason for visit:   Neck pain, tingling/stiffness on right arm, tingling/numbness on right leg    Estimated time of onset: 2020    Numeric Rating Scale:   Pain at Present:  3/10                                                                                                        Distribution of Pain:    right    Past Treatments for Current Pain Condition:   Physical Therapy and NSAIDS  Response to treatment: some relief    Prior diagnostic testing related to this condition:    MRI spine cervical 05/03/23

## 2023-05-17 ENCOUNTER — TELEPHONE (OUTPATIENT)
Dept: FAMILY MEDICINE CLINIC | Facility: CLINIC | Age: 48
End: 2023-05-17

## 2023-05-17 NOTE — TELEPHONE ENCOUNTER
Please call pt. We received a Pre-op request for a surgery scheduled for Cervical Radiculopathy with Dr. Susie Matta on 7/18/2023. Please call to schedule. Labs needed are scheduled through the [facility] pre-admission dept. Thank you.

## 2023-05-18 NOTE — TELEPHONE ENCOUNTER
Patient is scheduled for C5-C6, C6-C7 Anterior Cervical Disc Arthroplasty on 7.18.23 with Dr Pita Xiong at [facility].    Y pre-op apt scheduled (if sx is more than 30 days from last apt)  Y Surgical instructions reviewed by nursing staff with patient  Alexi Lang form completed  Y Surgery order signed   Y Placed sx on surgery sheet  Y Placed on outlook calendar  Y [employer] message sent to patient with sx instructions  Y Faxed pre-op clearance request to PCP CHICHI VALENCIA Faxed letter to prescribing provider requesting anticoagulants be held for surgery  Y E-mail sent to [employer]  Y Post-op appointments made  Y PA NEEDED. Routed to PA team to initiate. Y Post-Op outreach pt reminder placed.    Y Entire Neurosurgery Checklist Completed

## 2023-05-30 ENCOUNTER — HOSPITAL ENCOUNTER (OUTPATIENT)
Dept: GENERAL RADIOLOGY | Age: 48
Discharge: HOME | End: 2023-05-30
Attending: NEUROLOGICAL SURGERY
Payer: COMMERCIAL

## 2023-05-30 ENCOUNTER — TELEPHONE (OUTPATIENT)
Dept: SURGERY | Facility: CLINIC | Age: 48
End: 2023-05-30

## 2023-05-30 DIAGNOSIS — M54.12 CERVICAL RADICULOPATHY: ICD-10-CM

## 2023-05-30 PROCEDURE — 72052 X-RAY EXAM NECK SPINE 6/>VWS: CPT | Performed by: NEUROLOGICAL SURGERY

## 2023-05-30 NOTE — TELEPHONE ENCOUNTER
Called PT LVM informing pt of appt provider and time change. Requested she give us a call back to reschedule if need be. Appt 7/26/23 at 9:15am and 8/16/23 at 9:15am with John Morel changed to 7/26/23 at 9:00am and 8/16/23 at 9:00am with [facility].

## 2023-06-09 DIAGNOSIS — M54.12 CERVICAL RADICULOPATHY: Primary | ICD-10-CM

## 2023-06-12 RX ORDER — TRAMADOL HYDROCHLORIDE 100 MG/1
50 TABLET, COATED ORAL EVERY 6 HOURS PRN
Qty: 60 TABLET | Refills: 0 | Status: SHIPPED | OUTPATIENT
Start: 2023-06-12 | End: 2023-07-12

## 2023-06-13 ENCOUNTER — PATIENT MESSAGE (OUTPATIENT)
Dept: SURGERY | Facility: CLINIC | Age: 48
End: 2023-06-13

## 2023-06-13 NOTE — TELEPHONE ENCOUNTER
[facility] from nursing    Prior Authorization for Inpatient surgery initiated with [facility]  Requesting coverage for: Anterior cervical disc arthroplasty C5-C6, C6-C7   Date of Service: 7/18/2023   Inpatient days requested: 2 days  CPT codes: [address], [address] [place] [address], [address]    Request for surgery pending review, pending. Clinical notes submitted by fax, confirmation received.

## 2023-06-14 NOTE — TELEPHONE ENCOUNTER
From: Esa Campbell  To: Omer Hurst DO  Sent: 6/13/2023 5:27 PM CDT  Subject: Tramadol prescription change    Hello,  I had requested a refill for Tramadol to help with the pain prior to surgery next month. At my appointment Dr Tor Fox has changed the prescription to 120 pills at 100m every 4-6 hrs as needed for pain (one month supply) The refill was written as 1/2 tablet (100mg) every 6 hours 60 total pills. I will continue with the original directions per dr Mesa Rolling instructions. Will I need an appointment or can I just request a refill when I am out of this current prescription?  Thank you, Chin Larson

## 2023-06-14 NOTE — TELEPHONE ENCOUNTER
Message below noted. Routed to provider for input. According to script written: \"Take 50 mg by mouth every 6 (six) hours as needed. \"    Pt was prescribed 100mg tablets. No note indicating dose change from provider.

## 2023-06-19 NOTE — TELEPHONE ENCOUNTER
She was prescribed Tramadol 50 mg tablets. Directions state to take 2 tablets (which is 100 mg total) every 4-6 hours. She can send a Action message when she needs a refill, no need to make appointment. Thanks!

## 2023-06-23 ENCOUNTER — LABORATORY ENCOUNTER (OUTPATIENT)
Dept: LAB | Age: 48
End: 2023-06-23
Attending: NEUROLOGICAL SURGERY
Payer: COMMERCIAL

## 2023-06-23 ENCOUNTER — EKG ENCOUNTER (OUTPATIENT)
Dept: LAB | Age: 48
End: 2023-06-23
Attending: NEUROLOGICAL SURGERY
Payer: COMMERCIAL

## 2023-06-23 ENCOUNTER — HOSPITAL ENCOUNTER (OUTPATIENT)
Dept: GENERAL RADIOLOGY | Age: 48
Discharge: HOME | End: 2023-06-23
Attending: NEUROLOGICAL SURGERY
Payer: COMMERCIAL

## 2023-06-23 DIAGNOSIS — Z01.818 PRE-OP TESTING: ICD-10-CM

## 2023-06-23 LAB
ANTIBODY SCREEN: NEGATIVE
APTT PPP: 30.1 SECONDS (ref 23.3–35.6)
ATRIAL RATE: 80 BPM
BILIRUB UR QL STRIP.AUTO: NEGATIVE
COLOR UR AUTO: YELLOW
ERYTHROCYTE [DISTWIDTH] IN BLOOD BY AUTOMATED COUNT: 11.7 %
GLUCOSE UR STRIP.AUTO-MCNC: NEGATIVE MG/DL
HCT VFR BLD AUTO: 39.2 %
HGB BLD-MCNC: 12.7 G/DL
INR BLD: 0.97 (ref 0.85–1.16)
KETONES UR STRIP.AUTO-MCNC: NEGATIVE MG/DL
LEUKOCYTE ESTERASE UR QL STRIP.AUTO: NEGATIVE
MCH RBC QN AUTO: 31 PG (ref 26–34)
MCHC RBC AUTO-ENTMCNC: 32.4 G/DL (ref 31–37)
MCV RBC AUTO: 95.6 FL
NITRITE UR QL STRIP.AUTO: NEGATIVE
P AXIS: 66 DEGREES
P-R INTERVAL: 126 MS
PH UR STRIP.AUTO: 6 [PH] (ref 5–8)
PLATELET # BLD AUTO: 153 10(3)UL (ref 150–450)
PROT UR STRIP.AUTO-MCNC: NEGATIVE MG/DL
PROTHROMBIN TIME: 12.9 SECONDS (ref 11.6–14.8)
Q-T INTERVAL: 356 MS
QRS DURATION: 66 MS
QTC CALCULATION (BEZET): 410 MS
R AXIS: 47 DEGREES
RBC # BLD AUTO: 4.1 X10(6)UL
RH BLOOD TYPE: NEGATIVE
SP GR UR STRIP.AUTO: 1.01 (ref 1–1.03)
T AXIS: -1 DEGREES
UROBILINOGEN UR STRIP.AUTO-MCNC: <2 MG/DL
VENTRICULAR RATE: 80 BPM
WBC # BLD AUTO: 4.6 X10(3) UL (ref 4–11)

## 2023-06-23 PROCEDURE — 81001 URINALYSIS AUTO W/SCOPE: CPT

## 2023-06-23 PROCEDURE — 87081 CULTURE SCREEN ONLY: CPT

## 2023-06-23 PROCEDURE — 85610 PROTHROMBIN TIME: CPT

## 2023-06-23 PROCEDURE — 85027 COMPLETE CBC AUTOMATED: CPT

## 2023-06-23 PROCEDURE — 86901 BLOOD TYPING SEROLOGIC RH(D): CPT

## 2023-06-23 PROCEDURE — 86900 BLOOD TYPING SEROLOGIC ABO: CPT

## 2023-06-23 PROCEDURE — 86850 RBC ANTIBODY SCREEN: CPT

## 2023-06-23 PROCEDURE — 36415 COLL VENOUS BLD VENIPUNCTURE: CPT

## 2023-06-23 PROCEDURE — 85730 THROMBOPLASTIN TIME PARTIAL: CPT

## 2023-06-23 PROCEDURE — 93005 ELECTROCARDIOGRAM TRACING: CPT

## 2023-06-23 PROCEDURE — 93010 ELECTROCARDIOGRAM REPORT: CPT | Performed by: INTERNAL MEDICINE

## 2023-06-23 PROCEDURE — 71046 X-RAY EXAM CHEST 2 VIEWS: CPT | Performed by: NEUROLOGICAL SURGERY

## 2023-06-26 NOTE — TELEPHONE ENCOUNTER
We reviewed information received about the member's condition and circumstances. We used  the Clinical Policy Bulletin (CPB): Intervertebral Disc Prostheses. Based on CPB criteria and the  information we have, we're denying coverage for cervical intervertebral disc replacement. The  requirements for coverage are symptomatic cervical degenerative disc disease or herniated disc  at one or two levels, and all of the following:     (1) all other sources of pain were ruled out;     (2) presence of neck or cervico-brachial pain with findings of weakness, myelopathy, or sensory  deficit;     (3) imaging studies (CT or MRI) indicate (a) nerve root or spinal cord compression at the  level corresponding with the clinical findings or (b) central/lateral recess or foraminal stenosis  graded as moderate, moderate to severe, or severe;     (4) the member has failed at least six weeks  of conservative therapy within the past year that includes patient education, active modalities,  in-person physical therapy and medications (unless there is evidence of cord compression,  unless there is evidence of cord compression or other indications to waive the requirement for  conservative treatment);     (5) member has physical and neurological abnormalities confirming the  historical findings of nerve root or spinal cord compression (such as reflex change, sensory loss,  weakness) at or below the level of the lesion and may have gait or sphincter disturbance  (evidence of cervical radiculopathy or myelopathy), unless the radicular pattern of the symptoms  corresponds to the dermatomal distribution of the level of surgery; and     (6) member's activities of  daily living are limited by persistent neck or cervico-brachial pain. The member doesn't meet all  the requirements. We reviewed information received about the member's condition and circumstances.  We used  the MCG guidelines for Inpatient and Surgical Care, Ambulatory Surgery Exception Criteria. Based on MCG guidelines and the information we have, we can't give advance approval of an  inpatient hospital stay after surgery.  The requirements for coverage are: (    1) a need for inpatient  hospital care for a pre-operative disease or a condition as indicated by 1 or more of the following:  (a) severe infection, (b) altered mental status, (c) dangerous arrhythmia, (d) hypotension, (e)  hypoxemia, (f) other serious condition or finding that requires preoperative inpatient care (e.g.,  cannot be treated in other setting);     (2) an unusual or complex surgery requiring inpatient hospital  care, as indicated by 1 or more of the following: (a) unusually difficult procedure, (b) prolonged  airway monitoring required, or (c) other aspect or feature of procedure that indicates a need for  longer than usual postoperative care or monitoring;     (3) procedure is not low risk and the member  is at high anesthetic risk, as indicated by 1 or more of the following: (a) American Society of  Anesthesiologists class III or greater (severe systemic disease impairing function), (b) severe  frailty, (c) age 80 years or older, (d) severe valvular disease, (e) symptomatic coronary artery  disease, or heart failure, (f) symptomatic chronic lung disease, (g) severe renal disease (e.g., GFR  less than 30 mL/min/1.73m2 or on dialysis), (h) morbid obesity (e.g., body mass index greater  than 40) with hemodynamic or respiratory problems, or (i) other condition or finding that places  the member at increased anesthetic risk such that prolonged postoperative inpatient monitoring  or treatment is needed; (4) presence of drug-related risk, as indicated by 1 or more of the  following: (a) procedure requires discontinuing medication (e.g., antiarrhythmic medication,  antiseizure or anticoagulant medication), which necessitates preoperative or postoperative  inpatient monitoring or treatment, or (b) preoperative use of drugs that may interact with  anesthetic (e.g., cocaine, amphetamines, MAOIs) require longer post-operative monitoring or  treatment; or (5) inadequate outpatient care situation, as indicated by 1 or more of the following:  (a) member lives remote from medical facility and procedure has urgent complication potential,  and temporary nearby residence cannot be arranged, (b) member will have post-procedure  incapacitation and has inadequate assistance at home, or alternative level of care cannot be  arranged, (c) member will need longer general anesthesia or procedure side effect resolution  time, and competent person to stay with the member on first postoperative night at home or  alternative level of care cannot be arranged, or (d) other outpatient care situation that precludes  usual postoperative care plan. The member doesn't meet any of these requirements. If the  member needs inpatient care after surgery, we'll review any admission and stay concurrently. Peer to Peer Review: If you are a treating practitioner and you disagree with a coverage denial,  you may request a peer to peer review with the Medical Director who made the decision. To  request a peer to peer review, call the Medical Director's phone number at the end of this letter. You must request this review within 14 calendar days from the date of this letter. A Medical  Director will try to contact you within one business day of your request or at a time that you  specify. A peer to peer review is optional and not an appeal. It is a focused discussion during  which you may provide additional information and ask the Medical Director to reconsider the  decision. If you are not satisfied with the outcome of this review, you may appeal the coverage  denial as shown below. You may also appeal without a peer to peer review.     P2P scheduling line [phone]    Case # [id number]    CPT Code 69286, [id number], 06521 denied as well as inpatient stay

## 2023-06-27 NOTE — TELEPHONE ENCOUNTER
Abnormal results fax received from Mario DANIEL re: abnormal nasal culture 4+ MSSA.   Endorsed to RN for review

## 2023-06-29 RX ORDER — TRAMADOL HYDROCHLORIDE 50 MG/1
100 TABLET ORAL
Qty: 60 TABLET | Refills: 0 | Status: SHIPPED | OUTPATIENT
Start: 2023-06-29

## 2023-06-30 NOTE — TELEPHONE ENCOUNTER
Received notification of offer to conduct peer to peer from [id number] [employer] Team.         P2P scheduling line [phone]     Case # [id number]     CPT Code 70291, 47525, 86604 denied as well as inpatient stay     Subscriber Name [address] Birth Date Member ID    [facility] 3/20/1975 [id number]      Logan Burgos to arrange a peer to peer and spoke with [place]. Per [place]:  Peer to peer may be conducted on 7/5/23 from 1100 to 1300. Dr. Scarlett Land will be physician to speak with Dr. Katie Gann. P2P placed on [place] Schedule and routed to Dr. Katie Gann as notification of appointment.

## 2023-07-05 ENCOUNTER — TELEPHONE (OUTPATIENT)
Dept: SURGERY | Facility: CLINIC | Age: 48
End: 2023-07-05

## 2023-07-05 DIAGNOSIS — M54.12 CERVICAL RADICULOPATHY: Primary | ICD-10-CM

## 2023-07-05 NOTE — TELEPHONE ENCOUNTER
Nursing noted that denial for surgery coverage has been upheld. Patient to complete 6 weeks of conservative treatment:  PT ordered by Dr. Ricardo Mason today. Type Date User Summary Attachment   Provider Comments 07/05/2023  1:07 PM Micah Ribeiro DO Provider Comments -   Note:  Cervical traction    [id number] - OP REFERRAL TO [facility]   Associated DX:  Cervical radiculopathy (M54.12)     Order summary:  IHP - RFL, Routine, Referral to facility - [facility] 8 visits  Physical Therapy Area of Concentration: Ortho  Physical Therapy Ortho: Spine    Messaged patient and informed her of new PT orders and that 7/18/23 ADR surgery with Dr. Ricardo Mason will be canceled for now.      Surgery canceled via Case Change Request.

## 2023-07-05 NOTE — TELEPHONE ENCOUNTER
Dr Jaciel Turpin completed P2P this morning and denial was upheld:    \"Insurance wishes for patient to complete a recent course of PT prior to approval for surgery. Order for PT is placed.      MKK\"

## 2023-07-05 NOTE — TELEPHONE ENCOUNTER
Insurance wishes for patient to complete a recent course of PT prior to approval for surgery. Order for PT is placed.     MKK

## 2023-07-05 NOTE — TELEPHONE ENCOUNTER
Noted the surgeon had reviewed the patient preop labs and ordered Bactrim      Noted the patient is scheduled for surgery with Dr. Freda Enamorado on 7/18/2023.      Noted that pt had a + MRSA/MSSA Lab results     Routed to the providers for review and feedback

## 2023-07-05 NOTE — TELEPHONE ENCOUNTER
Received notification from Dr. Efren Crook that surgery denial has been upheld by [facility]. This has been addressed in \"Schedule Surgery\" TE initiated on 5/12/23. Patient notified of update via [employer] message.

## 2023-07-06 NOTE — TELEPHONE ENCOUNTER
Patient's  C5-C7 Anterior Cervical Disc Arthroplasty  on 7.18.23 with Dr Rafal Mckeon has been canceled due to insurance denial.    Case change request sent- Y  Changed on surgery sheet-Y  Changed on outlook calendar-Y  [employer] message sent to patient with new sx date-NA  Reminded of need for PCP clearance-NA  Post-op appointments changed Y  PA Needed.  Routed to PA team with sx date change NA  Email sent to Epic Surg with changes Y  New post-op outreach pt reminder placed NA

## 2023-07-12 RX ORDER — TRAMADOL HYDROCHLORIDE 50 MG/1
50 TABLET ORAL EVERY 6 HOURS PRN
Qty: 45 TABLET | Refills: 0 | Status: SHIPPED | OUTPATIENT
Start: 2023-07-12

## 2023-07-12 RX ORDER — TRAMADOL HYDROCHLORIDE 50 MG/1
100 TABLET ORAL
Qty: 60 TABLET | Refills: 0 | OUTPATIENT
Start: 2023-07-12

## 2023-07-12 NOTE — TELEPHONE ENCOUNTER
Noted the patient request listed below. Medication: traMADol 50 MG Oral Tab \"Take 2 tablets (100 mg total) by mouth every 4 to 6 hours as needed for Pain. \"     Date of last refill: 6/29/2023 (#56 tablets /7 day supply)  [place] 0/64/6324     Last office visit: 5/12/2023  Due back to clinic per last office note:  Not specified  Date next office visit scheduled:    Future Appointments   Date Time Provider Lydia Chavira   7/31/2023  1:40 PM DO JOSELUIS Brown [facility]           \"Last OV note recommendation:    ASSESSMENT:  1. Cervical radiculopathy     Plan:  1.  Recommend C5-7 ADR\"     Routed to the providers for review and feedback

## 2023-07-12 NOTE — TELEPHONE ENCOUNTER
Patient called the office and is requesting a refill for tramadol. Pt stated that jazmin accidentally sent the refill to the PCP and PCP approved a refill when the medication should have been ordered through her neurosurgery office. Informed patient that the issue will be discussed with the physician to inquire recommended next steps. Pt acknowledged and is appreciative.      Routed to the providers for review and feedback

## 2023-07-12 NOTE — TELEPHONE ENCOUNTER
Patient has been receiving Tramadol refills from 2 different providers. Her last refill was per PCP. Please discuss further with the patient, as she should only be receiving medication therapy from one provider. If she'd like our service to manage her Tramadol, please advise. NSGY provider gave 50 mg tablets q6h PRN for pain. PCP provided a higher dose. I would refill based off Marisa REGAN's previous prescription of 50 mg q6h PRN    Please advise.

## 2023-07-12 NOTE — TELEPHONE ENCOUNTER
Pt calling to request refill on traMADol 50 MG Oral Tab states she has a few days worth left. Verified pharmacy on file.

## 2023-07-24 NOTE — TELEPHONE ENCOUNTER
Medication:   traMADol 50 MG Oral Tab 45 tablet 0 7/12/2023    Sig:   Take 1 tablet (50 mg total) by mouth every 6 (six) hours as needed for Pain. Date of last refill: 7.12.23 (#45/0)  [place]      Last office visit: 5.12.23  Due back to clinic per last office note:  7.31.23  Date next office visit scheduled:    Future Appointments   Date Time Provider Lydia Chavira   7/31/2023  1:40 PM DO PATRICIA Conley2 [facility]           Last OV note recommendation:    \"    ASSESSMENT:  1.  Cervical radiculopathy\"

## 2023-07-24 NOTE — TELEPHONE ENCOUNTER
Pt is requesting dx 100 mg Patient calling to request refill of traMADol 50 MG Oral Tab   [id number] - [address] AT [facility] [address]     Patient informed of 48 hour refill policy excluding weekends and holidays. Informed patient prescription is sent directly to pharmacy. Further explained patient will not receive a call back once prescription is ready.

## 2023-07-25 RX ORDER — TRAMADOL HYDROCHLORIDE 50 MG/1
100 TABLET ORAL
Qty: 60 TABLET | Refills: 0 | Status: SHIPPED | OUTPATIENT
Start: 2023-07-25 | End: 2023-07-28

## 2023-07-28 ENCOUNTER — OFFICE VISIT (OUTPATIENT)
Dept: SURGERY | Facility: CLINIC | Age: 48
End: 2023-07-28
Payer: COMMERCIAL

## 2023-07-28 VITALS — DIASTOLIC BLOOD PRESSURE: 62 MMHG | HEART RATE: 100 BPM | SYSTOLIC BLOOD PRESSURE: 112 MMHG

## 2023-07-28 DIAGNOSIS — M54.12 CERVICAL RADICULOPATHY: Primary | ICD-10-CM

## 2023-07-28 PROCEDURE — 99213 OFFICE O/P EST LOW 20 MIN: CPT | Performed by: NEUROLOGICAL SURGERY

## 2023-07-28 PROCEDURE — 3078F DIAST BP <80 MM HG: CPT | Performed by: NEUROLOGICAL SURGERY

## 2023-07-28 PROCEDURE — 3074F SYST BP LT 130 MM HG: CPT | Performed by: NEUROLOGICAL SURGERY

## 2023-07-28 NOTE — PROGRESS NOTES
Patient is here for follow up after completing advised Physical Therapy for Cervical radiculopathy    Pt states no improvement in symptoms     Imaging:  no new imaging

## 2023-07-29 NOTE — PROGRESS NOTES
[address]  Neurological Surgery Clinic Note    Omer Kerr  1975  [id number]  PCP: Robel Blake MD    REASON FOR VISIT:  Neck pain and RUE radiculopathy    HISTORY OF PRESENT ILLNESS:  Omer Kerr is a(n) 41year old female who has neck pain and RUE radiculopathy and weakness of her tricep on the R. She has attempted PT with some improvement but continues to have pain and some weakness. She denies any falls. She denies any bowel/bladder habit changes. Location: neck pain  Quality: RUE radiculopathy/weakness  Severity: progressive  Duration: months  Timing: worse at night  Context: MRI shows DDD at C5-6 and extruded disc at C6-7  Modifying Factors: PT did no relieve symptoms   Associated signs/symptoms: pain      PAST MEDICAL HISTORY:  Past Medical History:   Diagnosis Date    Anemia     Back problem     Thrombocytopenia (HCC)     Visual impairment     glasses       PAST SURGICAL HISTORY:  Past Surgical History:   Procedure Laterality Date          x1    HYSTERECTOMY         FAMILY HISTORY:  family history includes Breast Cancer (age of onset: 27) in her maternal cousin female; Heart Disease in her father. SOCIAL HISTORY:   reports that she quit smoking about 24 years ago. Her smoking use included cigarettes. She has never used smokeless tobacco. She reports current alcohol use. She reports that she does not use drugs. ALLERGIES:    Iodine Solution [Po*    SWELLING  Shellfish-Derived P*    ANAPHYLAXIS    MEDICATIONS:  traMADol 50 MG Oral Tab, Take 1 tablet (50 mg total) by mouth every 6 (six) hours as needed for Pain., Disp: 45 tablet, Rfl: 0  EPINEPHrine (EPIPEN 2-SREEDHAR) 0.3 MG/0.3ML Injection Solution Auto-injector, INJECT 0.3 ML INTO THIGH AS DIRECTED ONE TIME AS DIRECTED, Disp: 2 each, Rfl: 2    Naloxone HCl LIQD 4 mg, 4 mg, Nasal, Once, Leonardo Tian MD        REVIEW OF SYSTEMS:  Review of Systems   All other systems reviewed and are negative. PHYSICAL EXAMINATION:  Neurologic Exam     Mental Status   Oriented to person, place, and time. Cranial Nerves   Cranial nerves II through XII intact. Motor Exam   Muscle bulk: normal  Overall muscle tone: normal  Right arm tone: normal  Left arm tone: normal  Right arm pronator drift: absent  Left arm pronator drift: absent  Right leg tone: normal  Left leg tone: normal    Strength   Strength 5/5 except as noted.    Right triceps: 4/5    Sensory Exam   Light touch normal.   Vibration normal.   Proprioception normal.   Pinprick normal.     Gait, Coordination, and Reflexes     Gait  Gait: normal    Reflexes   Right brachioradialis: 2+  Left brachioradialis: 2+  Right biceps: 2+  Left biceps: 2+  Right triceps: 2+  Left triceps: 2+  Right patellar: 2+  Left patellar: 2+  Right achilles: 2+  Left achilles: 2+  Right : 2+  Left : 2+       IMAGING:  As above    ASSESSMENT:  Cervical myeloradiculopathy    Plan:  Recommend C5-7 RAJEEV Miranda, DO  Neurological Surgery  [address] [facility]

## 2023-08-02 ENCOUNTER — TELEPHONE (OUTPATIENT)
Dept: SURGERY | Facility: CLINIC | Age: 48
End: 2023-08-02

## 2023-08-02 RX ORDER — TRAMADOL HYDROCHLORIDE 50 MG/1
50 TABLET ORAL EVERY 6 HOURS PRN
Qty: 60 TABLET | Refills: 0 | Status: SHIPPED | OUTPATIENT
Start: 2023-08-02

## 2023-08-02 NOTE — TELEPHONE ENCOUNTER
Pt calling to request refill on rx traMADol 100 mg. Informed the patient there is only a rx for traMADol 50 MG Oral tab.

## 2023-08-02 NOTE — TELEPHONE ENCOUNTER
Noted the provider message listed below.      Sent the patient a [employer] message to inform

## 2023-08-02 NOTE — TELEPHONE ENCOUNTER
Noted the patient request listed below. Medication: Tramadol 50mg      Date of last refill: 7/12/2023 (#45 tablets /8 day supply)  [place] 0/88/84     Last office visit: 7/28/2023 with Dr. Rodrigo Tsang   Due back to clinic per last office note:  not specified   Date next office visit scheduled:    No future appointments. Last OV note recommendation:    \"ASSESSMENT:  Cervical myeloradiculopathy     Plan:  Recommend C5-7 ADR\"     Routed to the providers for review and treatment.

## 2023-08-09 RX ORDER — TRAMADOL HYDROCHLORIDE 100 MG/1
100 TABLET, COATED ORAL EVERY 6 HOURS PRN
Qty: 60 TABLET | Refills: 0 | Status: SHIPPED | OUTPATIENT
Start: 2023-08-09 | End: 2023-09-08

## 2023-08-09 NOTE — TELEPHONE ENCOUNTER
Called pt regarding note below. No answer, LMTCB & Mychart message. Spoke to Dr. Roel Johnson who stated increasing the tramadol dose had not been discussed with patient. Dr. Roel Johnson does not recommend increasing the dose.

## 2023-08-09 NOTE — TELEPHONE ENCOUNTER
Noted the pt is retunring call regarding medication refill. Pt states the provider increased her tramadol medication the first office visit and has been filled only 50mg tablets since then. Informed patient that increasing pain medications before surgery may result more difficult pain management after surgery. Pt states her insurance company is not approving the surgery at this point because the are making her complete PT which is causing increase in pain,     Nursing acknowledged the patient concerns. And informed patient that her concerns will be endorsed to the surgeon for review. Pt acknowledged and call was ended. Spoke with Dr. Efren Corok in office. Dr Efren Crook agreed to fill the medication per pt request for this time only. Provider states that consistent long term usage 100mg q 6hr use of tramadol is not recommended for patient plan of care. If her surgery is denied, she will need to be deferred to pain management to handle her pain medications. Called patient to inform of the providers recommendations listed above. Pt acknowledged and is appreciative of the phone call.

## 2023-09-01 ENCOUNTER — OFFICE VISIT (OUTPATIENT)
Dept: SURGERY | Facility: CLINIC | Age: 48
End: 2023-09-01
Payer: COMMERCIAL

## 2023-09-01 VITALS
BODY MASS INDEX: 22.99 KG/M2 | DIASTOLIC BLOOD PRESSURE: 82 MMHG | HEIGHT: 65 IN | WEIGHT: 138 LBS | HEART RATE: 85 BPM | SYSTOLIC BLOOD PRESSURE: 112 MMHG

## 2023-09-01 DIAGNOSIS — G95.9 CERVICAL MYELOPATHY WITH CERVICAL RADICULOPATHY: Primary | ICD-10-CM

## 2023-09-01 DIAGNOSIS — M54.12 CERVICAL MYELOPATHY WITH CERVICAL RADICULOPATHY: Primary | ICD-10-CM

## 2023-09-01 PROCEDURE — 3079F DIAST BP 80-89 MM HG: CPT

## 2023-09-01 PROCEDURE — 3074F SYST BP LT 130 MM HG: CPT

## 2023-09-01 PROCEDURE — 99214 OFFICE O/P EST MOD 30 MIN: CPT

## 2023-09-01 PROCEDURE — 3008F BODY MASS INDEX DOCD: CPT

## 2023-09-05 ENCOUNTER — TELEPHONE (OUTPATIENT)
Dept: SURGERY | Facility: CLINIC | Age: 48
End: 2023-09-05

## 2023-09-05 DIAGNOSIS — M54.12 CERVICAL RADICULOPATHY: ICD-10-CM

## 2023-09-05 DIAGNOSIS — G95.9 CERVICAL MYELOPATHY WITH CERVICAL RADICULOPATHY: Primary | ICD-10-CM

## 2023-09-05 DIAGNOSIS — M54.12 CERVICAL MYELOPATHY WITH CERVICAL RADICULOPATHY: Primary | ICD-10-CM

## 2023-09-19 ENCOUNTER — PATIENT MESSAGE (OUTPATIENT)
Dept: SURGERY | Facility: CLINIC | Age: 48
End: 2023-09-19

## 2023-09-19 NOTE — TELEPHONE ENCOUNTER
Received notification of patient outreach by DUSTIN Jack       Patient did message as promised and sent PT session dates to include in letter. Patient also stated that: \"The reason why I stopped at PT after the five weeks was because jr was causing more pain then helping and I had lost  and arm strength. Even the physical therapist noticed and checked for it. \"      Physical Therapy dates included in appeal letter. Letter re-routed to JENNIFER Salazar. Messaged patient back and thanked her for the information.

## 2023-09-19 NOTE — TELEPHONE ENCOUNTER
From: Blayne Campbell  To: Andrew Kaufman Susan Reagan  Sent: 9/19/2023 2:11 PM CDT  Subject: PT and pain management dates     Lazarus Retort,  Thank you for your call and your assistance on this whole mess. Here is the information you had requested. Please let me know if you have any additional questions. PT Treatment dates   7/13  7/14  7/18  7/20  7/21  7/25  7/27  7/28  8/3  8/4  8/10  8/11    8/29 began with pain management doctor Dr Malathi Ornelas  10/2 will be my next appointment with Dr Oswald Sewell. Malathi Ornelas MD  [address] [place] [address] [phone]    He is out of network for me (self pay) He was chosen because he is a trusted known doctor to our family.  The insurance company approved medication prescribed by him 8/29/2023

## 2023-10-11 ENCOUNTER — PATIENT MESSAGE (OUTPATIENT)
Dept: SURGERY | Facility: CLINIC | Age: 48
End: 2023-10-11

## 2023-10-11 NOTE — TELEPHONE ENCOUNTER
From: Luis Campbell  To: Kateryna Hernándeztrini Bañuelosleana  Sent: 10/11/2023 10:25 AM CDT  Subject: Appt scheduled 10/18 and surgery date 10/20    Hello,    I was told that the surgery and all appointments for the surgery were cancelled until the insurance company responded to the letter that had been sent. However, I received a call from the hospital regarding testing requirements for surgery. It also appears that the surgery is still scheduled as well as the pre op appointments. Can you please update me on where this all stands. Can you also please confirm that the appointments have been canceled. I do not want to be charged for a missed appointment per the office policies.      Thank you,  Tim Jasso

## 2023-10-16 ENCOUNTER — TELEPHONE (OUTPATIENT)
Dept: SURGERY | Facility: CLINIC | Age: 48
End: 2023-10-16

## 2023-10-16 NOTE — TELEPHONE ENCOUNTER
Rcvd call from Charge nurse Roopa w/ pre admissions; Calling to inform the pt has not completed pre admission testing and pt advised her insurance has not approved her surgery scheduled on Friday 10/20/23; Roopa would like a call back on her direct line [phone]

## 2023-10-16 NOTE — TELEPHONE ENCOUNTER
Noted the patient is scheduled for Sx on 10/20/23.   Pt sx is currently denied and awaiting for appeal response from the pt insurance company.     Returned Roopa's call  Pt states that she does not want to complete PAT testing if her Surgery is going to be denied.   Nursing Acknowledged.

## (undated) DIAGNOSIS — F41.9 ANXIETY AND DEPRESSION: ICD-10-CM

## (undated) DIAGNOSIS — F32.A ANXIETY AND DEPRESSION: ICD-10-CM

## (undated) DEVICE — STERILE POLYISOPRENE POWDER-FREE SURGICAL GLOVES: Brand: PROTEXIS

## (undated) DEVICE — SOL  .9 1000ML BTL

## (undated) DEVICE — SUTURE MONOCRYL 4-0 PS-2

## (undated) DEVICE — TROCAR: Brand: KII® SLEEVE

## (undated) DEVICE — VISUALIZATION SYSTEM: Brand: CLEARIFY

## (undated) DEVICE — 40580 - THE PINK PAD - ADVANCED TRENDELENBURG POSITIONING KIT: Brand: 40580 - THE PINK PAD - ADVANCED TRENDELENBURG POSITIONING KIT

## (undated) DEVICE — INSUFFLATION NEEDLE TO ESTABLISH PNEUMOPERITONEUM.: Brand: INSUFFLATION NEEDLE

## (undated) DEVICE — TROCAR: Brand: KII FIOS FIRST ENTRY

## (undated) DEVICE — GYN LAP/ROBOTIC: Brand: MEDLINE INDUSTRIES, INC.

## (undated) DEVICE — KENDALL SCD EXPRESS SLEEVES, KNEE LENGTH, MEDIUM: Brand: KENDALL SCD

## (undated) DEVICE — 3M™ STERI-STRIP™ REINFORCED ADHESIVE SKIN CLOSURES, R1541, 1/4 IN X 3 IN (6 MM X 75 MM), 3 STRIPS/ENVELOPE: Brand: 3M™ STERI-STRIP™

## (undated) DEVICE — Device

## (undated) DEVICE — ADHESIVE MASTISOL 2/3CC VL

## (undated) DEVICE — HARMONIC CURVED BLADES 5MM: Brand: HARMONIC

## (undated) DEVICE — SUTURE PDS II 0 CT

## (undated) DEVICE — SOL H2O 3000ML IRRIG

## (undated) DEVICE — PLUMEPORT ACTIV LAPAROSCOPIC SMOKE FILTRATION DEVICE: Brand: PLUMEPORT ACTIVE

## (undated) DEVICE — SOLUTION SURG DURA PREP HAZMAT

## (undated) DEVICE — SOL LACT RINGERS 3000ML

## (undated) DEVICE — [HIGH FLOW INSUFFLATOR,  DO NOT USE IF PACKAGE IS DAMAGED,  KEEP DRY,  KEEP AWAY FROM SUNLIGHT,  PROTECT FROM HEAT AND RADIOACTIVE SOURCES.]: Brand: PNEUMOSURE

## (undated) DEVICE — TUBING CYSTO

## (undated) DEVICE — PKS LYONS DISSECTING FORCEPS 5MM/33CM: Brand: PK TECHNOLOGY

## (undated) DEVICE — LIGHT HANDLE

## (undated) DEVICE — VCARE LARGE, UTERINE MANIPULATOR, VAGINAL-CERVICAL-AHLUWALIA'S-RETRACTOR-ELEVATOR: Brand: VCARE

## (undated) DEVICE — 3M(TM) TEGADERM(TM) TRANSPARENT FILM DRESSING FRAME STYLE 9505W: Brand: 3M™ TEGADERM™

## (undated) DEVICE — PAD ULNAR NERVE PROTECTOR

## (undated) DEVICE — HARMONIC ACE +7 LAPAROSCOPIC SHEARS ADVANCED HEMOSTASIS 5MM DIAMETER 36CM SHAFT LENGTH  FOR USE WITH GRAY HAND PIECE ONLY: Brand: HARMONIC ACE

## (undated) DEVICE — TROCAR: Brand: KII SHIELDED BLADED ACCESS SYSTEM

## (undated) DEVICE — SUTURE PDS II 0 CT-1

## (undated) DEVICE — LAPAROSCOPIC TISSUE RETRIEVAL SAC FOR USE WITH MINIMALLY INVASIVE PROCEDURES: Brand: ESPINER TISSUE RETRIEVAL SYSTEM

## (undated) DEVICE — SPECIMEN TRAP LUKI

## (undated) NOTE — ED AVS SNAPSHOT
[facility] Emergency Department    [place]  [address] [address]    Phone:  [phone]    Fax:  [phone]   MRN: [id number]    Department:  [facility] Emergency Department   Date of Visit:  3/3/ IF THERE IS ANY CHANGE OR WORSENING OF YOUR CONDITION, CALL YOUR PRIMARY CARE PHYSICIAN AT ONCE OR RETURN IMMEDIATELY TO THE EMERGENCY DEPARTMENT.     If you have been prescribed any medication(s), please fill your prescription right away and begin taking t

## (undated) NOTE — ED AVS SNAPSHOT
Brianna Bergeorn   MRN: [id number]    Department:  [facility] Emergency Department   Date of Visit:  8/12/2017           Disclosure     Insurance plans vary and the physician(s) referred by the ER may not be covered by your plan.  Please contact you If you have been prescribed any medication(s), please fill your prescription right away and begin taking the medication(s) as directed    If the emergency physician has read X-rays, these will be re-interpreted by a radiologist.  If there is a significant

## (undated) NOTE — ED AVS SNAPSHOT
[facility] Emergency Department    [place]  [address]    [address] [place] [address]    Phone:  [phone]    Fax:  [phone]   MRN: [id number]    Department:  [facility] Emergency Department   Date of Visit:  3/3/ [phone]       To Check ER Wait Times:  TEXT 'ERwait' to 29537      Click [email or web address]      Or call [phone]    If you have any problems with your follow-up, please call our  at [phone]    Mitchel salazar problema con I have read and understand the instructions given to me by my caregivers. 24-Hour Pharmacies        Pharmacy Address Phone Number   [address] [facility] ([address]) [address] [place] [address].  [facility] Dictated by: Jose Miguel Cee MD on 3/03/2017 at 12:16       Approved by: Jose Miguel Cee MD              Narrative:    PROCEDURE:  XR CHEST PA + LAT CHEST (CPT=71020)     INDICATIONS:  vertigo, blurred vision, heart burn x2 weeks     COMPARISON:  [facility]ior Harris

## (undated) NOTE — LETTER
[facility] Testing Department  Phone: [phone]  OUTSIDE TESTING RESULT REQUEST      TO:   Dr. Nahid Ludwig and Staff Today's Date: 10/8/20    FAX #: [phone]     IMPORTANT: FOR YOUR IMMEDIATE ATTENTION  Please FAX all test results listed chava

## (undated) NOTE — MR AVS SNAPSHOT
[facility], [address] [address] [phone]               Thank you for choosing us for your health care visit with Tara Fisher MD.  We are glad to serve you and happy to provide you with this damon Return in about 1 year (around 4/17/2018).       Scheduling Instructions     Monday April 17, 2017     Neurology:  [facility]    Instructions:  Please call [phone] and select Option #1 to schedule your test.       Monday April 17, 2017     Imaging:  US [employer]EL [email or web address]. If you've recently had a stay at the Hospital you can access your discharge instructions in [employer] by going to Visits < Admission Summaries.  If you've been to the Emergency Department or your doctor's office, you can view yo

## (undated) NOTE — MR AVS SNAPSHOT
After Visit Summary   4/17/2017    Elizabeth Arellano    MRN: [id number]           Visit Information        Provider Department Dept Phone    4/17/2017  4:30 PM Riley Padilla MD [facility] [address]      Your Vitals Were     BP Pulse Temp(S COMP METABOLIC PANEL (14) [0478489 CUSTOM]  4/17/2017 (Approximate) 4/17/2018    [facility] [NEU5 CUSTOM]  4/17/2017 (Approximate) 4/17/2018    HPV HIGH RISK , THIN PREP COLLECTION [[id number] CUSTOM]  4/17/2017 4/17/2018    LIPID PANEL [[id number] CUSTOM]  4/17/2017